# Patient Record
Sex: FEMALE | Race: BLACK OR AFRICAN AMERICAN | NOT HISPANIC OR LATINO | Employment: OTHER | URBAN - METROPOLITAN AREA
[De-identification: names, ages, dates, MRNs, and addresses within clinical notes are randomized per-mention and may not be internally consistent; named-entity substitution may affect disease eponyms.]

---

## 2017-01-19 ENCOUNTER — TRANSCRIBE ORDERS (OUTPATIENT)
Dept: ADMINISTRATIVE | Facility: HOSPITAL | Age: 47
End: 2017-01-19

## 2017-01-23 ENCOUNTER — TRANSCRIBE ORDERS (OUTPATIENT)
Dept: ADMINISTRATIVE | Facility: HOSPITAL | Age: 47
End: 2017-01-23

## 2017-01-23 ENCOUNTER — HOSPITAL ENCOUNTER (OUTPATIENT)
Dept: RADIOLOGY | Facility: HOSPITAL | Age: 47
Discharge: HOME | End: 2017-01-23
Payer: COMMERCIAL

## 2017-01-23 DIAGNOSIS — M47.816 OSTEOARTHRITIS OF LUMBAR SPINE, UNSPECIFIED SPINAL OSTEOARTHRITIS COMPLICATION STATUS: ICD-10-CM

## 2017-01-23 DIAGNOSIS — M51.16 NEURITIS OR RADICULITIS DUE TO RUPTURE OF LUMBAR INTERVERTEBRAL DISC: ICD-10-CM

## 2017-01-23 DIAGNOSIS — M47.816 OSTEOARTHRITIS OF LUMBAR SPINE, UNSPECIFIED SPINAL OSTEOARTHRITIS COMPLICATION STATUS: Primary | ICD-10-CM

## 2017-01-23 PROCEDURE — 72110 X-RAY EXAM L-2 SPINE 4/>VWS: CPT

## 2017-01-23 PROCEDURE — 72200 X-RAY EXAM SI JOINTS: CPT

## 2017-02-16 ENCOUNTER — ALLSCRIPTS OFFICE VISIT (OUTPATIENT)
Dept: OTHER | Facility: OTHER | Age: 47
End: 2017-02-16

## 2017-03-29 ENCOUNTER — GENERIC CONVERSION - ENCOUNTER (OUTPATIENT)
Dept: OTHER | Facility: OTHER | Age: 47
End: 2017-03-29

## 2017-08-24 ENCOUNTER — HOSPITAL ENCOUNTER (EMERGENCY)
Facility: HOSPITAL | Age: 47
Discharge: HOME | End: 2017-08-24
Attending: EMERGENCY MEDICINE
Payer: COMMERCIAL

## 2017-08-24 VITALS
SYSTOLIC BLOOD PRESSURE: 129 MMHG | RESPIRATION RATE: 20 BRPM | BODY MASS INDEX: 38.29 KG/M2 | DIASTOLIC BLOOD PRESSURE: 63 MMHG | HEART RATE: 86 BPM | WEIGHT: 237.22 LBS | OXYGEN SATURATION: 99 % | TEMPERATURE: 98.6 F

## 2017-08-24 DIAGNOSIS — R07.89 CHEST TIGHTNESS: ICD-10-CM

## 2017-08-24 DIAGNOSIS — F41.9 ANXIETY: Primary | ICD-10-CM

## 2017-08-24 PROCEDURE — 93005 ELECTROCARDIOGRAM TRACING: CPT | Performed by: EMERGENCY MEDICINE

## 2017-08-24 PROCEDURE — 99283 EMERGENCY DEPT VISIT LOW MDM: CPT

## 2017-08-24 RX ORDER — BUPROPION HYDROCHLORIDE 450 MG/1
450 TABLET, FILM COATED, EXTENDED RELEASE ORAL DAILY
COMMUNITY
End: 2018-05-23

## 2017-08-24 RX ORDER — HYDROXYZINE HYDROCHLORIDE 25 MG/1
25 TABLET, FILM COATED ORAL 3 TIMES DAILY
COMMUNITY
End: 2018-05-23

## 2017-08-24 RX ORDER — LORAZEPAM 1 MG/1
1 TABLET ORAL ONCE
Status: COMPLETED | OUTPATIENT
Start: 2017-08-24 | End: 2017-08-24

## 2017-08-24 RX ADMIN — LORAZEPAM 1 MG: 1 TABLET ORAL at 16:40

## 2017-08-25 LAB
ATRIAL RATE: 84 BPM
P AXIS: 71 DEGREES
PR INTERVAL: 160 MS
QRS AXIS: -34 DEGREES
QRSD INTERVAL: 108 MS
QT INTERVAL: 340 MS
QTC INTERVAL: 401 MS
T WAVE AXIS: 36 DEGREES
VENTRICULAR RATE: 84 BPM

## 2017-08-30 ENCOUNTER — ALLSCRIPTS OFFICE VISIT (OUTPATIENT)
Dept: OTHER | Facility: OTHER | Age: 47
End: 2017-08-30

## 2017-08-30 DIAGNOSIS — R79.89 OTHER SPECIFIED ABNORMAL FINDINGS OF BLOOD CHEMISTRY: ICD-10-CM

## 2017-08-30 LAB
BILIRUB UR QL STRIP: NORMAL
CLARITY UR: NORMAL
COLOR UR: NORMAL
GLUCOSE (HISTORICAL): NORMAL
HGB UR QL STRIP.AUTO: NORMAL
KETONES UR STRIP-MCNC: NORMAL MG/DL
LEUKOCYTE ESTERASE UR QL STRIP: NORMAL
NITRITE UR QL STRIP: NORMAL
PH UR STRIP.AUTO: 5 [PH]
PROT UR STRIP-MCNC: NORMAL MG/DL
SP GR UR STRIP.AUTO: 1.02
UROBILINOGEN UR QL STRIP.AUTO: 0.2

## 2017-08-31 ENCOUNTER — APPOINTMENT (EMERGENCY)
Dept: CT IMAGING | Facility: HOSPITAL | Age: 47
End: 2017-08-31
Payer: COMMERCIAL

## 2017-08-31 ENCOUNTER — APPOINTMENT (EMERGENCY)
Dept: RADIOLOGY | Facility: HOSPITAL | Age: 47
End: 2017-08-31
Payer: COMMERCIAL

## 2017-08-31 ENCOUNTER — HOSPITAL ENCOUNTER (EMERGENCY)
Facility: HOSPITAL | Age: 47
Discharge: HOME | End: 2017-08-31
Admitting: EMERGENCY MEDICINE
Payer: COMMERCIAL

## 2017-08-31 VITALS
DIASTOLIC BLOOD PRESSURE: 65 MMHG | HEART RATE: 88 BPM | TEMPERATURE: 97.7 F | OXYGEN SATURATION: 100 % | RESPIRATION RATE: 18 BRPM | WEIGHT: 231 LBS | SYSTOLIC BLOOD PRESSURE: 145 MMHG

## 2017-08-31 DIAGNOSIS — M25.512 LEFT SHOULDER PAIN: ICD-10-CM

## 2017-08-31 DIAGNOSIS — R51.9 FACIAL PAIN, ACUTE: ICD-10-CM
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
## 2017-08-31 DIAGNOSIS — M54.2 NECK PAIN, ACUTE: ICD-10-CM

## 2017-08-31 PROCEDURE — 99284 EMERGENCY DEPT VISIT MOD MDM: CPT

## 2017-08-31 PROCEDURE — 70450 CT HEAD/BRAIN W/O DYE: CPT

## 2017-08-31 PROCEDURE — 73030 X-RAY EXAM OF SHOULDER: CPT

## 2017-08-31 PROCEDURE — 72125 CT NECK SPINE W/O DYE: CPT

## 2017-08-31 RX ORDER — BUPROPION HYDROCHLORIDE 150 MG/1
150 TABLET ORAL 3 TIMES DAILY
COMMUNITY

## 2017-08-31 RX ORDER — METAXALONE 800 MG/1
400 TABLET ORAL EVERY OTHER DAY
COMMUNITY
End: 2018-06-26

## 2017-08-31 RX ORDER — DIPHENOXYLATE HYDROCHLORIDE AND ATROPINE SULFATE 2.5; .025 MG/1; MG/1
1 TABLET ORAL DAILY
COMMUNITY

## 2017-08-31 RX ORDER — MELATONIN
5000 DAILY
COMMUNITY

## 2017-08-31 RX ORDER — HYDROXYZINE HYDROCHLORIDE 25 MG/1
25 TABLET, FILM COATED ORAL 2 TIMES DAILY
COMMUNITY

## 2017-08-31 RX ORDER — HYDROXYZINE HYDROCHLORIDE 25 MG/1
25 TABLET, FILM COATED ORAL ONCE
Status: COMPLETED | OUTPATIENT
Start: 2017-08-31 | End: 2017-08-31

## 2017-08-31 RX ADMIN — HYDROXYZINE HYDROCHLORIDE 25 MG: 25 TABLET, FILM COATED ORAL at 13:02

## 2017-09-08 ENCOUNTER — GENERIC CONVERSION - ENCOUNTER (OUTPATIENT)
Dept: OTHER | Facility: OTHER | Age: 47
End: 2017-09-08

## 2017-09-25 ENCOUNTER — HOSPITAL ENCOUNTER (OUTPATIENT)
Dept: ULTRASOUND IMAGING | Facility: HOSPITAL | Age: 47
Discharge: HOME | End: 2017-09-25
Attending: INTERNAL MEDICINE
Payer: COMMERCIAL

## 2017-09-25 DIAGNOSIS — R79.89 OTHER SPECIFIED ABNORMAL FINDINGS OF BLOOD CHEMISTRY: ICD-10-CM

## 2017-09-25 PROCEDURE — 76770 US EXAM ABDO BACK WALL COMP: CPT

## 2017-09-27 ENCOUNTER — GENERIC CONVERSION - ENCOUNTER (OUTPATIENT)
Dept: OTHER | Facility: OTHER | Age: 47
End: 2017-09-27

## 2017-09-27 ENCOUNTER — TRANSCRIBE ORDERS (OUTPATIENT)
Dept: ADMINISTRATIVE | Facility: HOSPITAL | Age: 47
End: 2017-09-27

## 2017-09-27 DIAGNOSIS — R68.89 OTHER ABNORMAL CLINICAL FINDING: Primary | ICD-10-CM

## 2017-09-29 ENCOUNTER — GENERIC CONVERSION - ENCOUNTER (OUTPATIENT)
Dept: OTHER | Facility: OTHER | Age: 47
End: 2017-09-29

## 2017-10-03 ENCOUNTER — GENERIC CONVERSION - ENCOUNTER (OUTPATIENT)
Dept: OTHER | Facility: OTHER | Age: 47
End: 2017-10-03

## 2017-10-17 ENCOUNTER — GENERIC CONVERSION - ENCOUNTER (OUTPATIENT)
Dept: OTHER | Facility: OTHER | Age: 47
End: 2017-10-17

## 2017-10-23 ENCOUNTER — ALLSCRIPTS OFFICE VISIT (OUTPATIENT)
Dept: OTHER | Facility: OTHER | Age: 47
End: 2017-10-23
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
## 2017-11-20 ENCOUNTER — GENERIC CONVERSION - ENCOUNTER (OUTPATIENT)
Dept: OTHER | Facility: OTHER | Age: 47
End: 2017-11-20

## 2017-11-20 ENCOUNTER — TRANSCRIBE ORDERS (OUTPATIENT)
Dept: ADMINISTRATIVE | Facility: HOSPITAL | Age: 47
End: 2017-11-20

## 2017-11-20 DIAGNOSIS — N63.10 MASS OF BREAST, RIGHT: Primary | ICD-10-CM

## 2017-11-24 ENCOUNTER — HOSPITAL ENCOUNTER (OUTPATIENT)
Dept: ULTRASOUND IMAGING | Facility: CLINIC | Age: 47
Discharge: HOME | End: 2017-11-24
Payer: COMMERCIAL

## 2017-11-24 ENCOUNTER — HOSPITAL ENCOUNTER (OUTPATIENT)
Dept: MAMMOGRAPHY | Facility: CLINIC | Age: 47
Discharge: HOME | End: 2017-11-24
Payer: COMMERCIAL

## 2017-11-24 DIAGNOSIS — N63.10 MASS OF BREAST, RIGHT: ICD-10-CM

## 2017-11-24 PROCEDURE — 76642 ULTRASOUND BREAST LIMITED: CPT

## 2017-11-24 PROCEDURE — G0279 TOMOSYNTHESIS, MAMMO: HCPCS

## 2017-11-24 PROCEDURE — G0204 DX MAMMO INCL CAD BI: HCPCS

## 2018-01-11 NOTE — RESULT NOTES
Message   renal u/s normal  have sent task to inform patient of such  Verified Results  US KIDNEY AND BLADDER [id number] 01:03PM Oscar Mi    Order Number: [id number]   Performing Comments: decreased GFR/elevated Cr from baseline - please evaluate kidneys; and please check liver; history of PBC   - Patient Instructions: To schedule this appointment, please contact Central Scheduling at [phone]  Test Name Result Flag Reference   US KIDNEY AND BLADDER (Report)     RENAL ULTRASOUND     INDICATION: Abnormal laboratory findings  COMPARISON: None  TECHNIQUE:  Ultrasound of the retroperitoneum was performed with a curvilinear transducer utilizing volumetric sweeps and still imaging techniques  FINDINGS:     KIDNEYS:     Right kidney: 10 1 x 4 0 cm  Normal echogenicity and contour  Cortical thickness: Normal    No suspicious masses detected  No hydronephrosis  No shadowing calculi  No perinephric fluid collections  Left kidney: 9 8 x 4 8 cm  Normal echogenicity and contour  Cortical thickness: Normal    No suspicious masses detected  No hydronephrosis  No shadowing calculi  No perinephric fluid collections  URETERS:   Nonvisualized  BLADDER:    Normally distended  No focal thickening or mass lesions  Bilateral ureteral jets detected               IMPRESSION:     Normal renal sonogram         Workstation performed: [id number]     Signed by:   Pili Burton MD   9/29/17

## 2018-01-14 VITALS
BODY MASS INDEX: 37.45 KG/M2 | SYSTOLIC BLOOD PRESSURE: 126 MMHG | HEIGHT: 66 IN | WEIGHT: 233 LBS | DIASTOLIC BLOOD PRESSURE: 74 MMHG

## 2018-01-15 VITALS
WEIGHT: 238 LBS | HEART RATE: 69 BPM | SYSTOLIC BLOOD PRESSURE: 125 MMHG | BODY MASS INDEX: 38.25 KG/M2 | HEIGHT: 66 IN | DIASTOLIC BLOOD PRESSURE: 70 MMHG

## 2018-01-16 NOTE — RESULT NOTES
Verified Results  * XR CHEST PA & LATERAL [id number] 10:40AM Anthony Barajas Order Number: [id number]     Test Name Result Flag Reference   XR CHEST PA & LATERAL (Report)     CHEST      INDICATION: Solitary pulmonary nodule  COMPARISON: Chest x-ray dated July 1, 2016  CTA chest dated July 1, 2016  VIEWS: Frontal and lateral projections; 2 images     FINDINGS:        Cardiomediastinal silhouette appears unremarkable  The lungs are clear  No pneumothorax or pleural effusion  No lung nodules are identified  Visualized osseous structures appear within normal limits for the patient's age  IMPRESSION:     No active pulmonary disease         Workstation performed: [id number]     Signed by:   Sugar Wallace MD   10/12/16

## 2018-01-16 NOTE — RESULT NOTES
Message   Cr stable  minimal proteinuria  await 24 hour urine   needs improved DM control        Verified Results  Nephrology Flowsheet [id number] 02:03PM [facility]     Test Name Result Flag Reference   WBC 4 7     Hemoglobin 12 7     Hematocrit 38 1     Platelets 157     Sodium 135     Potassium 4 6     Chloride 105     Carbon Dioxide 26     Calcium 9 2     GLUCOSE 112     BUN 17     Serum Creatinine 1 20     GFR, NON-AFRICAN AMERICAN 55     Magnesium 2 3     Urine Protein Creatinine Ratio 0 09     Urinalysis Date 8/31/17     C3 158     C4 41 5     SPECIFIC GRAVITY UA 1 021     BLOOD UA NEG     PH UA 6     PROTEIN UA NEG     NITRITE UA NEG     LEUKOCYTE ESTERASE UA NEG     Glucose, Urine >JP=665

## 2018-01-17 NOTE — MISCELLANEOUS
Message   Recorded as Task   Date: 09/25/2017 04:54 PM, Created By: Neo Doss   Task Name: Follow Up   Assigned To: Neo Doss   Regarding Patient: Cristino Clay, Status: Complete   Comment:    Neo Doss - 25 Sep 2017 4:54 PM     TASK CREATED  pt had kidney US done today  your comments were to also check liver,  h/o PBC  Per the US tech, that needs to be a separate US of the right upper quadrant and will have to be ordered separately  Do you want the pt to have that done also? Paulino Oretga - 26 Sep 2017 9:11 PM     TASK REPLIED TO: Previously Assigned To Aishwarya Valle    no, should be ok for now  will await these results, then determine    thank you                        Jaron Lakhani - 27 Sep 2017 1:07 PM     TASK COMPLETED        Active Problems    1  Chest pain, unspecified type (786 50) (R07 9)   2  Diabetes mellitus (250 00) (E11 9)   3  Diffuse pain (780 96) (R52)   4  Elevated CK (790 5) (R74 8)   5  Elevated serum creatinine (790 99) (R79 89)   6  Environmental allergies (V15 09) (Z91 09)   7  Fatigue, unspecified type (780 79) (R53 83)   8  Fibromyalgia (729 1) (M79 7)   9  Lung nodule, solitary (793 11) (R91 1)   10  Muscle ache (729 1) (M79 1)   11  Neck pain (723 1) (M54 2)   12  Numbness and tingling of left arm and leg (782 0) (R20 0,R20 2)   13  Other chronic back pain (724 5,338 29) (M54 9,G89 29)   14  SOB (shortness of breath) on exertion (786 05) (R06 02)    Current Meds   1  BusPIRone HCl - 10 MG Oral Tablet; Take 1 tablet daily; Therapy: 93Fas0777 to Recorded   2  Glyxambi 10-5 MG Oral Tablet; Take 1 tablet daily; Therapy: 98Smn5082 to Recorded   3  Metaxalone 800 MG Oral Tablet; take 0 5 tablet daily; Therapy: 81Sey4646 to Recorded   4  Multiple Vitamins Oral Tablet; TAKE 1 TABLET DAILY; Therapy: 20Lvf5951 to Recorded   5  Silenor 6 MG Oral Tablet; Take 1 tablet daily; Therapy: 43Fpw7362 to Recorded   6   Wellbutrin  MG Oral Tablet Extended Release 12 Hour (BuPROPion HCl ER   (SR)); TAKE 1 TABLET DAILY AS DIRECTED; Therapy: 91Ghx8186 to Recorded    Allergies    1  Bactrim TABS   2  Shellfish-derived Products    3  Dust   4   Mold    Signatures   Electronically signed by : Mayur Luciano, ; Sep 27 2017  3:09PM EST                       (Author)

## 2018-01-18 NOTE — MISCELLANEOUS
Message     Recorded as Task   Date: 09/29/2017 02:24 PM, Created By: Arash Stovall   Task Name: Follow Up   Assigned To: Rakesh Remy   Regarding Patient: Davis Willis, Status: Active   Comment:    Paulino Ortega - 29 Sep 2017 2:24 PM     TASK CREATED  hello    can patient be notified that renal u/s is normal    thank you    manuela  Left message for pt regarding the above  Active Problems    1  Chest pain, unspecified type (786 50) (R07 9)   2  Diabetes mellitus (250 00) (E11 9)   3  Diffuse pain (780 96) (R52)   4  Elevated CK (790 5) (R74 8)   5  Elevated serum creatinine (790 99) (R79 89)   6  Environmental allergies (V15 09) (Z91 09)   7  Fatigue, unspecified type (780 79) (R53 83)   8  Fibromyalgia (729 1) (M79 7)   9  Lung nodule, solitary (793 11) (R91 1)   10  Muscle ache (729 1) (M79 1)   11  Neck pain (723 1) (M54 2)   12  Numbness and tingling of left arm and leg (782 0) (R20 0,R20 2)   13  Other chronic back pain (724 5,338 29) (M54 9,G89 29)   14  SOB (shortness of breath) on exertion (786 05) (R06 02)    Current Meds   1  BusPIRone HCl - 10 MG Oral Tablet; Take 1 tablet daily; Therapy: 73Esn7730 to Recorded   2  Glyxambi 10-5 MG Oral Tablet; Take 1 tablet daily; Therapy: 37Kwd3284 to Recorded   3  Metaxalone 800 MG Oral Tablet; take 0 5 tablet daily; Therapy: 35Vng0716 to Recorded   4  Multiple Vitamins Oral Tablet; TAKE 1 TABLET DAILY; Therapy: 50Odh8515 to Recorded   5  Silenor 6 MG Oral Tablet; Take 1 tablet daily; Therapy: 58Pyq1657 to Recorded   6  Wellbutrin  MG Oral Tablet Extended Release 12 Hour (BuPROPion HCl ER   (SR)); TAKE 1 TABLET DAILY AS DIRECTED; Therapy: 32Cfp9589 to Recorded    Allergies    1  Bactrim TABS   2  Shellfish-derived Products    3  Dust   4   Mold    Signatures   Electronically signed by : Can Casey, ; Oct  3 2017  8:32AM EST                       (Author)

## 2018-01-22 ENCOUNTER — ALLSCRIPTS OFFICE VISIT (OUTPATIENT)
Dept: OTHER | Facility: OTHER | Age: 48
End: 2018-01-22

## 2018-01-23 DIAGNOSIS — R79.89 OTHER SPECIFIED ABNORMAL FINDINGS OF BLOOD CHEMISTRY: ICD-10-CM

## 2018-01-23 DIAGNOSIS — N63.0 MASS OF BREAST: ICD-10-CM

## 2018-01-23 NOTE — CONSULTS
Assessment   1  Abnormal finding on breast imaging (793 89) (R92 8)   2  Breast lump (611 72) (N63 0)    Plan   Abnormal finding on breast imaging, Breast lump    · MAMMO DIAGNOSTIC RIGHT W CAD; Status:Hold For - Scheduling; Requested    for:[id number]; Perform:[facility]; SRV:[id number];RSOVRME; For:Abnormal finding on breast imaging, Breast lump; Ordered By:Debora Stallings;  Breast lump    · *US BREAST RIGHT LIMITED (DIAGNOSTIC); Status:Hold For - Scheduling; Requested    Win Oliver; Perform:[facility]; TON:[id number];DZLOQYS; For:Breast lump; Ordered By:Debora Stallings;   · Follow-up visit in 6 months Evaluation and Treatment  Follow-up  Status: Hold For -    Scheduling  Requested for: [id number]   Ordered; For: Breast lump; Ordered By: Christal Ritter Performed:  Due: [id number]; Last Updated By: Regan Cabello; 2018 3:24:38 PM    Discussion/Summary    19-year-old female with a right breast lump that is a benign in appearance and also on examination today has benign characteristics  She recently noted this; however, this does appear to be present on her imaging back to 2016  However additional diagnostic imaging was recommended in six months, which will be the end of May  I will make these arrangements for her  I will see her again in six months for short-term follow-up her sooner should need arise  Chief Complaint   Chief Complaint Free Text Note Form: Pt is here for a consultation regarding a right breast palpable lump  Pt shares she has felt this lump for a couple months and it is located at the 1-2 0'clock at the areola  Pt denies any nipple drainage  Breast imagin17 Ruslan 3D dx mammogram/right US, B3 (2)  Providers: Danica Isabel  History of Present Illness   HPI: 19-year-old female here today secondary to a lump in the inner right breast  She recently noted this  She did have diagnostic imaging  The area was seen and benign and likely present on her 2016 imaging  She denies any family history of breast cancer  Review of Systems   Complete Female ROS SurgOnc:      Constitutional: recent weight gain-- and-- recent weight loss  Eyes: No complaints of visual problems, no scleral icterus  ENT: no complaints of ear pain, no hoarseness, no difficulty swallowing,-- no tinnitus-- and-- no new masses in head, oral cavity, or neck  Cardiovascular: palpitations  Respiratory: No complaints of shortness of breath, no cough  Gastrointestinal: No complaints of jaundice, no bloody stools, no pale stools  Genitourinary: No complaints of dysuria, no hematuria, no nocturia, no frequent urination, no urethral discharge  Musculoskeletal: myalgias  Integumentary: No complaints of rash, no new lesions  Neurological: headache  Hematologic/Lymphatic: No complaints of easy bruising  Active Problems   1  Abnormal finding on breast imaging (793 89) (R92 8)   2  Breast lump (611 72) (N63 0)   3  Chest pain, unspecified type (786 50) (R07 9)   4  Diabetes mellitus (250 00) (E11 9)   5  Diffuse pain (780 96) (R52)   6  Elevated CK (790 5) (R74 8)   7  Elevated serum creatinine (790 99) (R79 89)   8  Environmental allergies (V15 09) (Z91 09)   9  Fatigue, unspecified type (780 79) (R53 83)   10  Fibromyalgia (729 1) (M79 7)   11  Lung nodule, solitary (793 11) (R91 1)   12  Muscle ache (729 1) (M79 1)   13  Neck pain (723 1) (M54 2)   14  Numbness and tingling of left arm and leg (782 0) (R20 0,R20 2)   15  Other chronic back pain (724 5,338 29) (M54 9,G89 29)   16  SOB (shortness of breath) on exertion (786 05) (R06 02)    Past Medical History   1  History of Anxiety (300 00) (F41 9)   2  History of Difficulty walking (719 7) (R26 2)   3  History of Difficulty walking up stairs (719 7) (R26 2)   4  History of arthritis (V13 4) (Z87 39)   5  History of cardiac murmur (V12 59) (Z86 79)   6  History of depression (V11 8) (Z86 59)   7   History of headache (V13 89) (Z87 898)   8  History of insomnia (V13 89) (Z87 898)   9  History of palpitations (V12 59) (Z87 898)   10  History of pregnancy (V13 29)   11  History of sore throat (V12 60) (Z87 09)   12  History of Menarche (V21 8)   13  History of Primary biliary cirrhosis (571 6) (K74 3)   14  History of Uses birth control (V25 9) (Z30 9)  Active Problems And Past Medical History Reviewed: The active problems and past medical history were reviewed and updated today  Surgical History   1  History of Hysterectomy  Surgical History Reviewed: The surgical history was reviewed and updated today  Family History   Mother    1  Family history of arthritis (V17 7) (Z82 61)   2  Family history of cardiac disorder (V17 49) (Z82 49)   3  Family history of diabetes mellitus (V18 0) (Z83 3)   4  Family history of malignant neoplasm of cervix (V16 49) (Z80 49)  Father    5  Family history of Malignant neoplasm of overlapping sites of left lung  Maternal Grandmother    6  Family history of multiple myeloma (V16 7) (Z80 7)  Paternal Uncle    7  Family history of malignant neoplasm of stomach (V16 0) (Z80 0)  Family History Reviewed: The family history was reviewed and updated today  Social History    · Current smoker (305 1) (F17 200)   ·    · Never a smoker   · Occasional alcohol use  Social History Reviewed: The social history was reviewed and updated today  The social history was reviewed and is unchanged  Current Meds    1  Atarax TABS; pt takes 75mg daily for anxiety; Therapy: [phone]) to Recorded   2  Ativan 0 5 MG Oral Tablet; TAKE 1 TABLET DAILY; Therapy: [phone]) to Recorded   3  Flax Seed Oil 1000 MG Oral Capsule; pt takes 1000mg daily; Therapy: [phone]) to Recorded   4  Glyxambi 10-5 MG Oral Tablet; Take 1 tablet daily; Therapy: 19Pez1667 to Recorded   5  Metaxalone 800 MG Oral Tablet; take 0 5 tablet daily;      Therapy: [id number] to Recorded   6  Milk Thistle CAPS; 100mg daily; Therapy: (Emerson June) to Recorded   7  Multiple Vitamins Oral Tablet; TAKE 1 TABLET DAILY; Therapy: 30Aug2017 to Recorded   8  Silenor 6 MG Oral Tablet; Take 1 tablet daily; Therapy: 30Aug2017 to Recorded   9  Vitamin D3 TABS; pt takes 5,000 iu every other day; Therapy: (Emerson June) to Recorded   10  Wellbutrin  MG Oral Tablet Extended Release 12 Hour; TAKE 1 TABLET DAILY AS      DIRECTED; Therapy: 30Aug2017 to Recorded  Medication List Reviewed: The medication list was reviewed and updated today  Allergies   1  Bactrim TABS   2  Shellfish-derived Products  3  Dust   4  Mold    Vitals   Vital Signs    Recorded: 38FPU4657 02:43PM   Temperature 100 2 F   Heart Rate 98   Respiration 12   Systolic 256   Diastolic 70   Height 5 ft 6 in   Weight 238 lb    BMI Calculated 38 41   BSA Calculated 2 15   O2 Saturation 99     Physical Exam        Constitutional: General appearance: The Patient is well-developed, well-nourished female who appears her stated age in no acute distress  She is pleasant and talkative  Neuro: Orientation to person, place and time: Normal  -- Mood and affect: Normal        Lymphatic: no evidence of cervical adenopathy bilaterally  -- no evidence of axillary adenopathy bilaterally  Skin: Examination of Breast: Abnormal   Breast: Examination of both breasts revealed fibrocystic changes  Examination of the right breast revealed no erythema,-- no swelling-- and-- no tenderness  Examination of the left breast revealed no erythema,-- no swelling-- and-- no tenderness  Nipples appeared normal No discharge was noted from the nipples  A superficial, mobile, nontender subareolar mass was palpated in the right breast  No mass was palpable in the left breast      Axillary nodes: no enlarged nodes        Results/Data   Diagnostic Studies Reviewed Surg Onc:      X-ray Review 11/24/17 Bilateral 3D diagnostic mammogram and right breast ultrasound is likely benign: there is a small hypoechoic lesion at two o'clock in the area of palpable finding subcentimeter in size; this appears to be present on her imaging from 2016  There are no other concerns  She is a density category two        Signatures    Electronically signed by : COLEMAN Luke ; Jan 22 2018  4:20PM EST                       (Author)

## 2018-02-01 ENCOUNTER — TRANSCRIBE ORDERS (OUTPATIENT)
Dept: ADMINISTRATIVE | Facility: HOSPITAL | Age: 48
End: 2018-02-01

## 2018-02-01 DIAGNOSIS — Z12.31 VISIT FOR SCREENING MAMMOGRAM: Primary | ICD-10-CM

## 2018-02-07 ENCOUNTER — DOCUMENTATION (OUTPATIENT)
Dept: NEPHROLOGY | Facility: CLINIC | Age: 48
End: 2018-02-07

## 2018-02-22 DIAGNOSIS — E83.41 HYPERMAGNESEMIA: Primary | ICD-10-CM

## 2018-03-22 ENCOUNTER — DOCUMENTATION (OUTPATIENT)
Dept: NEUROLOGY | Facility: CLINIC | Age: 48
End: 2018-03-22

## 2018-05-08 ENCOUNTER — TELEPHONE (OUTPATIENT)
Dept: NEUROLOGY | Facility: CLINIC | Age: 48
End: 2018-05-08

## 2018-05-08 DIAGNOSIS — R51.9 FREQUENT HEADACHES: Primary | ICD-10-CM

## 2018-05-08 RX ORDER — BUTALBITAL, ACETAMINOPHEN AND CAFFEINE 50; 325; 40 MG/1; MG/1; MG/1
1 TABLET ORAL DAILY PRN
Qty: 30 TABLET | Refills: 0 | Status: SHIPPED | OUTPATIENT
Start: 2018-05-08 | End: 2018-09-25 | Stop reason: SDUPTHER

## 2018-05-08 RX ORDER — VERAPAMIL HYDROCHLORIDE 120 MG/1
120 CAPSULE, EXTENDED RELEASE ORAL
Qty: 30 CAPSULE | Refills: 2 | Status: SHIPPED | OUTPATIENT
Start: 2018-05-08 | End: 2018-05-23

## 2018-05-08 NOTE — TELEPHONE ENCOUNTER
I called the patient and explained that two RXs were sent to her pharmacy which should not affect her kidney function

## 2018-05-08 NOTE — TELEPHONE ENCOUNTER
The patient called and was recently diagnosed with fibromyalgia  However, for the past 2 months she has been having bad headaches  She is seeing you on 6/26 for a fu, however, she was wandering in the meantime if you could recommend what she could do  She did have MRI of the brain in 12/2016 which was normal   She is not on any medications right now for her headaches as her kidney function is decreasing

## 2018-05-23 ENCOUNTER — TELEPHONE (OUTPATIENT)
Dept: NEPHROLOGY | Facility: CLINIC | Age: 48
End: 2018-05-23

## 2018-05-23 ENCOUNTER — OFFICE VISIT (OUTPATIENT)
Dept: NEPHROLOGY | Facility: CLINIC | Age: 48
End: 2018-05-23
Payer: COMMERCIAL

## 2018-05-23 VITALS
SYSTOLIC BLOOD PRESSURE: 110 MMHG | DIASTOLIC BLOOD PRESSURE: 62 MMHG | HEIGHT: 66 IN | WEIGHT: 229 LBS | BODY MASS INDEX: 36.8 KG/M2

## 2018-05-23 DIAGNOSIS — R79.89 ELEVATED SERUM CREATININE: Primary | ICD-10-CM

## 2018-05-23 PROCEDURE — 99213 OFFICE O/P EST LOW 20 MIN: CPT | Performed by: INTERNAL MEDICINE

## 2018-05-23 NOTE — TELEPHONE ENCOUNTER
----- Message from Chelsea Cooper sent at 5/23/2018  1:22 PM EDT -----  Left message with office regarding the above   ----- Message -----  From: Jordyn Thompson MD  Sent: 5/23/2018  12:15 PM  To: Nephrology [address] [facility]    Can we inform Dr Yunior Sinclair office to inform them that patient had positive ASO titer and ZENA titer on repeat tests  These are at Dr Carl Soliman office and they will be sending over  Pt wanted us to make sure Dr Jose Alfredo Lopez office knew about these  From renal end - if we can let Dr Jose Alfredo Lopez office know that renal function is stable       Thank you    manuela

## 2018-05-23 NOTE — LETTER
May 23, 2018     Delia Carter DO  [address] [facility]  [address]  [address] [place] [address]    Patient: Gloria Mac   YOB: 1970   Date of Visit: 5/23/2018       Dear Dr Donald Lambert Recipients: Thank you for referring Gloria Mac to me for evaluation  Below are my notes for this consultation  If you have questions, please do not hesitate to call me  I look forward to following your patient along with you  Sincerely,        Adan Newberry MD        CC: No Recipients  Adan Newberry MD  5/23/2018 12:39 PM  Sign at close encounter  [id number] 41 y [address]  female MRN: [id number]  5/23/2018    Reason for Visit: decreased GFR    ASSESSMENT and PLAN:    I had the pleasure of seeing Ms Jhony Pedersen today in the renal clinic for the continued management of decreased GFR  Since our last visit, there has been no ER visits or hospitalizations  He currently has no complaints at this time and is feeling well  Patient denies any chest pain, shortness of breath and swelling  Continues to have fibromyalgia pain  40 yo female with PMHx of fibromyalgia, primary biliary cholangitis, DM II since 2011, depression, anxiety who presents for f/u visit for elevated Cr  Patient had initially presented to ER at [facility] in Jul of 2016 With chest pain  Patient had a negative nuclear stress test at that time  Patient had an extensive serological workup of which in ZENA was positive, CRP was elevated 8 7  Patient had a negative hepatitis B surface antigen, Lyme, TPO, G6PD, ANCA, Sjogren, HLA B27, anti ds DNA, Hep C  Patient had elevated mitochondrial Ab titer; SPEP with elevated total globulin and beta globulin  ZENA was positive 1:160 with speckled pattern and ASO titer high at 318 and treat with antibiotic  Patient was also noted to have elevated CPK and was being seen by Neurology with negative EMG   Admitted to the hospital on July 1 2017 for chest pain with a normal nuclear stress test  Was also treated for urinary tract infection  Mother had renal transplant; father had HTN and had CKD; mother also had heart disease  Youngest brother with solitary kidney  Patient has strong family history of rheumatoid arthritis, sarcoidosis in cousin, and lupus  1) elevated Cr - CR 0 98 in 2016  KURT normal KURT  ANCA neg; Anti DS DNA Ab neg, ASO elevated 318, ZENA present speckled pattern, but positive anti mitochondrial Ab  repeat labs in January 2017 with negative cryo, negative DNAse Ab, negative anti sm mm Ab, negative ZENA screen  C3, C4 normal  Labs from 4/2017 with Creatinine 1 12 mg/dL  labs from 8/28 - UA with + glucose, negative protein, negative blood; Cr was 1 25 mg/dL with normal electrolytes  Overall, Cr is slowly rising since 2016 but is now stable, but i am not sure of prior values prior to last year  Patient has history of DM since 2011, but A1c appears relatively well controlled  Glyxambi can cause nephrotoxicity when used with ACEi or ARB or other nephrotoxic agents but patient is not taking nephrotoxic agents  Though, patient did take NSAIDs 1-2 times per week due to headaches  Patient is no longer taking NSAIDs  During last visit, repeat labwork including BMP, 24 hour urine collection, C3, C4  Patient has an extensive serological work up completed in the past that does not suggest an active autoimmune disorder that could be affecting the kidney or secondary causes such as hepatitis       - Cr in august is 1 2 mg/dL, electrolytes stable, GFR 55  - C3, 158, C4 41 5  - UPCR 0 09 mg/dL  - UA - neg blood, neg protein; + glucose  - 24 hour CrCl in Sept 2017 is 108 ml/min - appropriate for weight; adequate collection  - repeat 24 hour CrCl is 109 in 2018  - renal u/s - sept 2017 - with R 10 1 cm, L 9 8 cm, nl echogenicity, nl renal u/s    - ok to continue glyxambi for now, but may need to change if GFR < 45  - avoid NSAIDs if possible  - for now, I reviewed with the patient that I am not seeing sign of autoimmune issue with kidney, NAE, or progression of renal dysfunction  CrCl are appropriate  - I offered second opinion if patient would want - pt declines for now  - we will message Dr Frank Tyler office regarding positive ASO per PCP office (we do not have these labs but pt states she already spoke with PCP to send records to Rheumatology office)  - labwork in 3 months, appt thereafter    2) Primary biliary cirrhosis - + anti mitochondiral Ab; positive ZENA    3) lung nodule - followed with pulm  3 mm nodule    4) pre DM - Hb1c 6  3  and on 8/28 is 6 5%    - avoid glyxambia if CrCl < 45    5) elevated CPK prior- aldolase 2    as per Primary Team    6) fibromyalgia    7) mag 2 3 - slightly higher than nl    - repeat is stable    It was a pleasure evaluating Ms Ashley Jha in the renal office and thank you for allowing our team to participate in the care of your patient  Please do not hesitate to contact our team in the interim if further questions/issues arise  No problem-specific Assessment & Plan notes found for this encounter  HPI:    Patient presents for follow-up visit  Denies complaints with the exception of chronic pain  PATIENT INSTRUCTIONS:    Patient Instructions   1) please do your labwork in 3 months        OBJECTIVE:  Current Weight: Weight - Scale: 104 kg (229 lb)  Vitals:    05/23/18 1144   BP: 110/62   BP Location: Right arm   Patient Position: Sitting   Cuff Size: Large   Weight: 104 kg (229 lb)   Height: 5' 6" (1 676 m)    Body mass index is 36 96 kg/m²  REVIEW OF SYSTEMS:    Review of Systems   Constitutional: Negative  Negative for fatigue  HENT: Negative  Eyes: Negative  Respiratory: Negative  Negative for shortness of breath  Cardiovascular: Negative  Negative for leg swelling  Gastrointestinal: Negative  Endocrine: Negative  Genitourinary: Negative  Negative for difficulty urinating     Musculoskeletal: Positive for arthralgias and back pain  Skin: Negative  Allergic/Immunologic: Negative  Neurological: Negative  Hematological: Negative  Psychiatric/Behavioral: Negative  All other systems reviewed and are negative  PHYSICAL EXAM:      Physical Exam   Constitutional: She is oriented to person, place, and time  She appears well-developed and well-nourished  No distress  HENT:   Head: Normocephalic and atraumatic  Mouth/Throat: No oropharyngeal exudate  Eyes: Conjunctivae are normal  Right eye exhibits no discharge  Left eye exhibits no discharge  No scleral icterus  Neck: Normal range of motion  Neck supple  No JVD present  Cardiovascular: Normal rate and regular rhythm  Exam reveals no gallop and no friction rub  No murmur heard  Pulmonary/Chest: Effort normal and breath sounds normal  No respiratory distress  She has no wheezes  She has no rales  Abdominal: Soft  Bowel sounds are normal  She exhibits no distension  There is no tenderness  There is no rebound  Musculoskeletal: Normal range of motion  She exhibits no edema, tenderness or deformity  Neurological: She is alert and oriented to person, place, and time  Coordination normal    Skin: Skin is warm and dry  No rash noted  She is not diaphoretic  No erythema  No pallor  Psychiatric: She has a normal mood and affect  Her behavior is normal  Judgment and thought content normal    Nursing note and vitals reviewed        Medications:    Current Outpatient Prescriptions:     buPROPion (WELLBUTRIN XL) 150 mg 24 hr tablet, Take 150 mg by mouth daily, Disp: , Rfl:     butalbital-acetaminophen-caffeine (FIORICET,ESGIC) -40 mg per tablet, Take 1 tablet by mouth daily as needed for headaches or migraine, Disp: 30 tablet, Rfl: 0    cholecalciferol (VITAMIN D3) 1,000 units tablet, Take 1,000 Units by mouth daily, Disp: , Rfl:     hydrOXYzine HCL (ATARAX) 25 mg tablet, Take 25 mg by mouth 2 (two) times a day  , Disp: , Rfl:     multivitamin (THERAGRAN) TABS, Take 1 tablet by mouth daily, Disp: , Rfl:     metaxalone (SKELAXIN) 800 mg tablet, Take 400 mg by mouth every other day, Disp: , Rfl:     NON FORMULARY, Glyambi  10/ 5 for diabetes   Take 1 tablet in the morning , Disp: , Rfl:     Laboratory Results:        Results for orders placed or performed in visit on 08/30/17   POCT urinalysis dipstick (Historical)   Result Value Ref Range    Color, UA Maria Isabel     Clarity, UA Cloudy     Leukocytes, UA neg     Nitrite, UA neg     Blood, UA neg     Bilirubin, UA neg     Urobilinogen, UA 0 2     Protein, UA trace     pH, UA 5 0     Specific Miracle, UA 1 025     Ketones, UA neg     Glucose neg

## 2018-05-23 NOTE — PROGRESS NOTES
NEPHROLOGY OFFICE VISIT   René Ramirez 41 y o  female MRN: [id number]  5/23/2018    Reason for Visit: decreased GFR    ASSESSMENT and PLAN:    I had the pleasure of seeing Ms Paige Yates today in the renal clinic for the continued management of decreased GFR  Since our last visit, there has been no ER visits or hospitalizations  He currently has no complaints at this time and is feeling well  Patient denies any chest pain, shortness of breath and swelling  Continues to have fibromyalgia pain  42 yo female with PMHx of fibromyalgia, primary biliary cholangitis, DM II since 2011, depression, anxiety who presents for f/u visit for elevated Cr  Patient had initially presented to ER at [facility] in Jul of 2016 With chest pain  Patient had a negative nuclear stress test at that time  Patient had an extensive serological workup of which in ZENA was positive, CRP was elevated 8 7  Patient had a negative hepatitis B surface antigen, Lyme, TPO, G6PD, ANCA, Sjogren, HLA B27, anti ds DNA, Hep C  Patient had elevated mitochondrial Ab titer; SPEP with elevated total globulin and beta globulin  ZENA was positive 1:160 with speckled pattern and ASO titer high at 318 and treat with antibiotic  Patient was also noted to have elevated CPK and was being seen by Neurology with negative EMG  Admitted to the hospital on July 1 2017 for chest pain with a normal nuclear stress test  Was also treated for urinary tract infection  Mother had renal transplant; father had HTN and had CKD; mother also had heart disease  Youngest brother with solitary kidney  Patient has strong family history of rheumatoid arthritis, sarcoidosis in cousin, and lupus  1) elevated Cr - CR 0 98 in 2016  KURT normal KURT  ANCA neg; Anti DS DNA Ab neg, ASO elevated 318, ZENA present speckled pattern, but positive anti mitochondrial Ab  repeat labs in January 2017 with negative cryo, negative DNAse Ab, negative anti sm mm Ab, negative ZENA screen   C3, C4 normal  Labs from 4/2017 with Creatinine 1 12 mg/dL  labs from 8/28 - UA with + glucose, negative protein, negative blood; Cr was 1 25 mg/dL with normal electrolytes  Overall, Cr is slowly rising since 2016 but is now stable, but i am not sure of prior values prior to last year  Patient has history of DM since 2011, but A1c appears relatively well controlled  Glyxambi can cause nephrotoxicity when used with ACEi or ARB or other nephrotoxic agents but patient is not taking nephrotoxic agents  Though, patient did take NSAIDs 1-2 times per week due to headaches  Patient is no longer taking NSAIDs  During last visit, repeat labwork including BMP, 24 hour urine collection, C3, C4  Patient has an extensive serological work up completed in the past that does not suggest an active autoimmune disorder that could be affecting the kidney or secondary causes such as hepatitis  - Cr in august is 1 2 mg/dL, electrolytes stable, GFR 55  - C3, 158, C4 41 5  - UPCR 0 09 mg/dL  - UA - neg blood, neg protein; + glucose  - 24 hour CrCl in Sept 2017 is 108 ml/min - appropriate for weight; adequate collection  - repeat 24 hour CrCl is 109 in 2018  - renal u/s - sept 2017 - with R 10 1 cm, L 9 8 cm, nl echogenicity, nl renal u/s    - ok to continue glyxambi for now, but may need to change if GFR < 45  - avoid NSAIDs if possible  - for now, I reviewed with the patient that I am not seeing sign of autoimmune issue with kidney, NAE, or progression of renal dysfunction  CrCl are appropriate  - I offered second opinion if patient would want - pt declines for now  - we will message Dr Khushboo Yoder office regarding positive ASO per PCP office (we do not have these labs but pt states she already spoke with PCP to send records to Rheumatology office)  - labwork in 3 months, appt thereafter    2) Primary biliary cirrhosis - + anti mitochondiral Ab; positive ZENA    3) lung nodule - followed with pulm  3 mm nodule    4) pre DM - Hb1c 6  3  and on 8/28 is 6 5%    - avoid glyxambia if CrCl < 45    5) elevated CPK prior- aldolase 2    as per Primary Team    6) fibromyalgia    7) mag 2 3 - slightly higher than nl    - repeat is stable    It was a pleasure evaluating Ms Aldo Conley in the renal office and thank you for allowing our team to participate in the care of your patient  Please do not hesitate to contact our team in the interim if further questions/issues arise  No problem-specific Assessment & Plan notes found for this encounter  HPI:    Patient presents for follow-up visit  Denies complaints with the exception of chronic pain  PATIENT INSTRUCTIONS:    Patient Instructions   1) please do your labwork in 3 months        OBJECTIVE:  Current Weight: Weight - Scale: 104 kg (229 lb)  Vitals:    05/23/18 1144   BP: 110/62   BP Location: Right arm   Patient Position: Sitting   Cuff Size: Large   Weight: 104 kg (229 lb)   Height: 5' 6" (1 676 m)    Body mass index is 36 96 kg/m²  REVIEW OF SYSTEMS:    Review of Systems   Constitutional: Negative  Negative for fatigue  HENT: Negative  Eyes: Negative  Respiratory: Negative  Negative for shortness of breath  Cardiovascular: Negative  Negative for leg swelling  Gastrointestinal: Negative  Endocrine: Negative  Genitourinary: Negative  Negative for difficulty urinating  Musculoskeletal: Positive for arthralgias and back pain  Skin: Negative  Allergic/Immunologic: Negative  Neurological: Negative  Hematological: Negative  Psychiatric/Behavioral: Negative  All other systems reviewed and are negative  PHYSICAL EXAM:      Physical Exam   Constitutional: She is oriented to person, place, and time  She appears well-developed and well-nourished  No distress  HENT:   Head: Normocephalic and atraumatic  Mouth/Throat: No oropharyngeal exudate  Eyes: Conjunctivae are normal  Right eye exhibits no discharge  Left eye exhibits no discharge   No scleral icterus  Neck: Normal range of motion  Neck supple  No JVD present  Cardiovascular: Normal rate and regular rhythm  Exam reveals no gallop and no friction rub  No murmur heard  Pulmonary/Chest: Effort normal and breath sounds normal  No respiratory distress  She has no wheezes  She has no rales  Abdominal: Soft  Bowel sounds are normal  She exhibits no distension  There is no tenderness  There is no rebound  Musculoskeletal: Normal range of motion  She exhibits no edema, tenderness or deformity  Neurological: She is alert and oriented to person, place, and time  Coordination normal    Skin: Skin is warm and dry  No rash noted  She is not diaphoretic  No erythema  No pallor  Psychiatric: She has a normal mood and affect  Her behavior is normal  Judgment and thought content normal    Nursing note and vitals reviewed  Medications:    Current Outpatient Prescriptions:     buPROPion (WELLBUTRIN XL) 150 mg 24 hr tablet, Take 150 mg by mouth daily, Disp: , Rfl:     butalbital-acetaminophen-caffeine (FIORICET,ESGIC) -40 mg per tablet, Take 1 tablet by mouth daily as needed for headaches or migraine, Disp: 30 tablet, Rfl: 0    cholecalciferol (VITAMIN D3) 1,000 units tablet, Take 1,000 Units by mouth daily, Disp: , Rfl:     hydrOXYzine HCL (ATARAX) 25 mg tablet, Take 25 mg by mouth 2 (two) times a day  , Disp: , Rfl:     multivitamin (THERAGRAN) TABS, Take 1 tablet by mouth daily, Disp: , Rfl:     metaxalone (SKELAXIN) 800 mg tablet, Take 400 mg by mouth every other day, Disp: , Rfl:     NON FORMULARY, Glyambi  10/ 5 for diabetes   Take 1 tablet in the morning , Disp: , Rfl:     Laboratory Results:        Results for orders placed or performed in visit on 08/30/17   POCT urinalysis dipstick (Historical)   Result Value Ref Range    Color, UA Maria Isabel     Clarity, UA Cloudy     Leukocytes, UA neg     Nitrite, UA neg     Blood, UA neg     Bilirubin, UA neg     Urobilinogen, UA 0 2 Protein, UA trace     pH, UA 5 0     Specific [place], UA 1 025     Ketones, UA neg     Glucose neg

## 2018-05-24 DIAGNOSIS — N63.0 MASS OF BREAST: ICD-10-CM

## 2018-05-24 DIAGNOSIS — R92.8 OTHER ABNORMAL AND INCONCLUSIVE FINDINGS ON DIAGNOSTIC IMAGING OF BREAST: ICD-10-CM

## 2018-05-30 LAB
EXT GLUCOSE BLD: 121
EXTERNAL BUN: 15
EXTERNAL CALCIUM: 8.8
EXTERNAL CHLORIDE: 107
EXTERNAL CO2: 27
EXTERNAL CREATININE: 1.17
EXTERNAL EGFR: 56
EXTERNAL POTASSIUM: 4.4
EXTERNAL SODIUM: 140

## 2018-06-26 ENCOUNTER — OFFICE VISIT (OUTPATIENT)
Dept: NEUROLOGY | Facility: CLINIC | Age: 48
End: 2018-06-26
Payer: COMMERCIAL

## 2018-06-26 VITALS
HEART RATE: 76 BPM | HEIGHT: 66 IN | BODY MASS INDEX: 36.8 KG/M2 | WEIGHT: 229 LBS | SYSTOLIC BLOOD PRESSURE: 128 MMHG | DIASTOLIC BLOOD PRESSURE: 74 MMHG

## 2018-06-26 DIAGNOSIS — R93.7 ABNORMAL MRI, CERVICAL SPINE: ICD-10-CM

## 2018-06-26 DIAGNOSIS — Z87.39 HISTORY OF FIBROMYALGIA: ICD-10-CM

## 2018-06-26 DIAGNOSIS — G43.119 INTRACTABLE MIGRAINE WITH AURA WITHOUT STATUS MIGRAINOSUS: ICD-10-CM

## 2018-06-26 DIAGNOSIS — G44.309 POST-CONCUSSION HEADACHE: ICD-10-CM

## 2018-06-26 DIAGNOSIS — R51.9 FREQUENT HEADACHES: ICD-10-CM

## 2018-06-26 DIAGNOSIS — M54.2 NECK PAIN: Primary | ICD-10-CM

## 2018-06-26 DIAGNOSIS — G44.86 CERVICOGENIC HEADACHE: ICD-10-CM

## 2018-06-26 PROCEDURE — 99215 OFFICE O/P EST HI 40 MIN: CPT | Performed by: PSYCHIATRY & NEUROLOGY

## 2018-06-26 RX ORDER — LORAZEPAM 0.5 MG/1
TABLET ORAL
COMMUNITY

## 2018-06-26 RX ORDER — AMITRIPTYLINE HYDROCHLORIDE 25 MG/1
25 TABLET, FILM COATED ORAL
Qty: 30 TABLET | Refills: 1 | Status: SHIPPED | OUTPATIENT
Start: 2018-06-26 | End: 2019-11-13

## 2018-06-26 RX ORDER — DOXEPIN HYDROCHLORIDE 10 MG/1
CAPSULE ORAL
COMMUNITY
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
## 2018-07-06 ENCOUNTER — HOSPITAL ENCOUNTER (OUTPATIENT)
Dept: ULTRASOUND IMAGING | Facility: CLINIC | Age: 48
Discharge: HOME | End: 2018-07-06
Payer: COMMERCIAL

## 2018-07-06 ENCOUNTER — HOSPITAL ENCOUNTER (OUTPATIENT)
Dept: MAMMOGRAPHY | Facility: CLINIC | Age: 48
Discharge: HOME | End: 2018-07-06
Payer: COMMERCIAL

## 2018-07-06 DIAGNOSIS — R92.8 OTHER ABNORMAL AND INCONCLUSIVE FINDINGS ON DIAGNOSTIC IMAGING OF BREAST: ICD-10-CM

## 2018-07-06 DIAGNOSIS — N63.0 MASS OF BREAST: ICD-10-CM

## 2018-07-06 PROCEDURE — G0279 TOMOSYNTHESIS, MAMMO: HCPCS

## 2018-07-06 PROCEDURE — 76642 ULTRASOUND BREAST LIMITED: CPT

## 2018-07-06 PROCEDURE — 77065 DX MAMMO INCL CAD UNI: CPT

## 2018-07-19 ENCOUNTER — TELEPHONE (OUTPATIENT)
Dept: SURGICAL ONCOLOGY | Facility: HOSPITAL | Age: 48
End: 2018-07-19

## 2018-07-19 NOTE — TELEPHONE ENCOUNTER
Pt called and questioned whether she needed to see Dr MONSIVAIS [facility] at her scheduled appt for Wednesday since her breast imaging was stable and she was concerned about her financial concerns/copay  Spoke with Dr MONSIVAIS [facility] and she recommended pt could follow up in [facility]  Pt chose to be seen in Dec 2018  Appt scheduled

## 2018-08-03 ENCOUNTER — OFFICE VISIT (OUTPATIENT)
Dept: NEUROSURGERY | Facility: CLINIC | Age: 48
End: 2018-08-03
Payer: COMMERCIAL

## 2018-08-03 VITALS
DIASTOLIC BLOOD PRESSURE: 80 MMHG | BODY MASS INDEX: 37.16 KG/M2 | WEIGHT: 231.2 LBS | TEMPERATURE: 98.5 F | HEIGHT: 66 IN | HEART RATE: 87 BPM | SYSTOLIC BLOOD PRESSURE: 120 MMHG

## 2018-08-03 DIAGNOSIS — R93.7 ABNORMAL MRI, CERVICAL SPINE: ICD-10-CM

## 2018-08-03 DIAGNOSIS — M54.2 NECK PAIN: Primary | ICD-10-CM

## 2018-08-03 DIAGNOSIS — G96.191 PERINEURAL CYSTS: ICD-10-CM

## 2018-08-03 PROCEDURE — 99203 OFFICE O/P NEW LOW 30 MIN: CPT | Performed by: PHYSICIAN ASSISTANT

## 2018-08-03 NOTE — PATIENT INSTRUCTIONS
Suggest you pursue Pain Management evaluation  Recommend you also follow up with your established Neurologist, Psychiatrist, and Primary Care Provider  Seek medical attention and present to an Emergency Room if experience worsening or new neck/arm pain, sensory or motor change, bladder or bowel dysfunction, or other neurological change
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
## 2018-08-06 ENCOUNTER — TELEPHONE (OUTPATIENT)
Dept: NEUROSURGERY | Facility: CLINIC | Age: 48
End: 2018-08-06

## 2018-08-06 NOTE — TELEPHONE ENCOUNTER
Patient LM on nurse line stating that she saw Jerry Méndez last Friday and she referred her to pain mgmt  Patient states that she is experiencing "fibromyalgia symptoms", and when she called pain management, they told her that "they do not treat fibromyalgia symptoms"  Would you like me to place a referral for rheumatology or would you like patient to schedule visit with pain management? Please advise  Thanks

## 2018-08-06 NOTE — TELEPHONE ENCOUNTER
When Ms Edison Rios was seen in office on 8/3/18 she was offered referral to [facility] but patient declined referral  (Patient reported she preferred at that point to discuss option for Pain Management referral with her Psychiatrist)  Patient had mentioned she had seen seen a Rheumatologist in past too  May provide Ms Ramirez with referral for Pain Management evaluation if she wishes  We typically refer to [facility] but she may want to consider seeing a Pain Management provider elsewhere if [facility] does not manage patient's with Fibromyalgia  Please touch base with [facility] to inquire if they manage patient's with fibromyalgia and updated patient  She may want to follow up with her prior Rheumatologist too if she has concerns for Fibromyalgia management  Please relay to Ms  Earlineravi  Thank you

## 2018-08-07 NOTE — TELEPHONE ENCOUNTER
Attempted to reach patient to give her the following recommendations with no answer  LMOM for patient to call back at her convenience

## 2018-08-13 ENCOUNTER — APPOINTMENT (EMERGENCY)
Dept: CT IMAGING | Facility: HOSPITAL | Age: 48
End: 2018-08-13
Payer: COMMERCIAL

## 2018-08-13 ENCOUNTER — HOSPITAL ENCOUNTER (EMERGENCY)
Facility: HOSPITAL | Age: 48
Discharge: HOME | End: 2018-08-13
Attending: EMERGENCY MEDICINE | Admitting: EMERGENCY MEDICINE
Payer: COMMERCIAL

## 2018-08-13 VITALS
DIASTOLIC BLOOD PRESSURE: 67 MMHG | TEMPERATURE: 98.3 F | BODY MASS INDEX: 37.24 KG/M2 | HEART RATE: 60 BPM | HEIGHT: 66 IN | SYSTOLIC BLOOD PRESSURE: 146 MMHG | WEIGHT: 231.7 LBS | RESPIRATION RATE: 16 BRPM | OXYGEN SATURATION: 98 %

## 2018-08-13 DIAGNOSIS — S09.90XA MINOR HEAD INJURY, INITIAL ENCOUNTER: Primary | ICD-10-CM

## 2018-08-13 PROCEDURE — 70450 CT HEAD/BRAIN W/O DYE: CPT

## 2018-08-13 PROCEDURE — 99284 EMERGENCY DEPT VISIT MOD MDM: CPT

## 2018-08-13 PROCEDURE — 72125 CT NECK SPINE W/O DYE: CPT

## 2018-08-13 RX ORDER — IBUPROFEN 400 MG/1
400 TABLET ORAL ONCE
Status: COMPLETED | OUTPATIENT
Start: 2018-08-13 | End: 2018-08-13

## 2018-08-13 RX ADMIN — IBUPROFEN 400 MG: 400 TABLET ORAL at 05:22

## 2018-08-13 NOTE — DISCHARGE INSTRUCTIONS
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
## 2018-09-25 DIAGNOSIS — R51.9 FREQUENT HEADACHES: ICD-10-CM

## 2018-09-25 RX ORDER — BUTALBITAL, ACETAMINOPHEN AND CAFFEINE 50; 325; 40 MG/1; MG/1; MG/1
1 TABLET ORAL DAILY PRN
Qty: 30 TABLET | Refills: 0 | Status: SHIPPED | OUTPATIENT
Start: 2018-09-25 | End: 2019-09-23 | Stop reason: SDUPTHER

## 2018-10-05 LAB
CREAT ?TM UR-SCNC: 154 UMOL/L
MICROALBUMIN UR-MCNC: 1.2 MG/L (ref 0–20)
MICROALBUMIN/CREAT UR: 7.8 MG/G{CREAT}

## 2018-10-10 ENCOUNTER — OFFICE VISIT (OUTPATIENT)
Dept: NEPHROLOGY | Facility: CLINIC | Age: 48
End: 2018-10-10
Payer: COMMERCIAL

## 2018-10-10 VITALS
SYSTOLIC BLOOD PRESSURE: 128 MMHG | WEIGHT: 231 LBS | DIASTOLIC BLOOD PRESSURE: 80 MMHG | BODY MASS INDEX: 37.12 KG/M2 | HEIGHT: 66 IN

## 2018-10-10 DIAGNOSIS — R79.89 ELEVATED SERUM CREATININE: Primary | ICD-10-CM

## 2018-10-10 PROCEDURE — 99213 OFFICE O/P EST LOW 20 MIN: CPT | Performed by: INTERNAL MEDICINE

## 2018-10-10 RX ORDER — AMITRIPTYLINE HYDROCHLORIDE 25 MG/1
TABLET, FILM COATED ORAL
COMMUNITY
End: 2018-12-18 | Stop reason: HOSPADM
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
## 2018-11-30 ENCOUNTER — HOSPITAL ENCOUNTER (OUTPATIENT)
Dept: MAMMOGRAPHY | Facility: CLINIC | Age: 48
Discharge: HOME | End: 2018-11-30
Payer: COMMERCIAL

## 2018-11-30 ENCOUNTER — HOSPITAL ENCOUNTER (OUTPATIENT)
Dept: ULTRASOUND IMAGING | Facility: CLINIC | Age: 48
Discharge: HOME | End: 2018-11-30
Payer: COMMERCIAL

## 2018-11-30 VITALS — HEIGHT: 66 IN | WEIGHT: 225 LBS | BODY MASS INDEX: 36.16 KG/M2

## 2018-11-30 DIAGNOSIS — Z12.31 VISIT FOR SCREENING MAMMOGRAM: ICD-10-CM

## 2018-11-30 DIAGNOSIS — R92.8 ABNORMAL MAMMOGRAM: ICD-10-CM

## 2018-11-30 DIAGNOSIS — N63.10 LUMP OF RIGHT BREAST: ICD-10-CM

## 2018-11-30 DIAGNOSIS — R92.8 ABNORMAL MAMMOGRAM: Primary | ICD-10-CM

## 2018-11-30 PROCEDURE — G0279 TOMOSYNTHESIS, MAMMO: HCPCS

## 2018-11-30 PROCEDURE — 76642 ULTRASOUND BREAST LIMITED: CPT

## 2018-11-30 PROCEDURE — 77066 DX MAMMO INCL CAD BI: CPT

## 2018-11-30 NOTE — PROGRESS NOTES
Met with patient and Dr Mukesh Al regarding recommendation for;      _____ RIGHT ___x___LEFT      __x___Ultrasound guided  ______Stereotactic  Breast biopsy  __x___Verbalized understanding        Blood thinners:  _____yes __x___no    Date stopped: ____n/a_______    Biopsy teaching sheet given:  ____x___yes ______no

## 2018-12-05 ENCOUNTER — HOSPITAL ENCOUNTER (OUTPATIENT)
Dept: ULTRASOUND IMAGING | Facility: CLINIC | Age: 48
Discharge: HOME | End: 2018-12-05
Payer: COMMERCIAL

## 2018-12-05 ENCOUNTER — HOSPITAL ENCOUNTER (OUTPATIENT)
Dept: MAMMOGRAPHY | Facility: CLINIC | Age: 48
Discharge: HOME | End: 2018-12-05

## 2018-12-05 VITALS — HEART RATE: 80 BPM | SYSTOLIC BLOOD PRESSURE: 122 MMHG | DIASTOLIC BLOOD PRESSURE: 64 MMHG

## 2018-12-05 DIAGNOSIS — R92.8 ABNORMAL MAMMOGRAM: ICD-10-CM

## 2018-12-05 PROCEDURE — 88342 IMHCHEM/IMCYTCHM 1ST ANTB: CPT | Performed by: PATHOLOGY

## 2018-12-05 PROCEDURE — 88305 TISSUE EXAM BY PATHOLOGIST: CPT | Performed by: PATHOLOGY

## 2018-12-05 PROCEDURE — 19083 BX BREAST 1ST LESION US IMAG: CPT

## 2018-12-05 PROCEDURE — 88341 IMHCHEM/IMCYTCHM EA ADD ANTB: CPT | Performed by: PATHOLOGY

## 2018-12-05 RX ORDER — LIDOCAINE HYDROCHLORIDE 10 MG/ML
4 INJECTION, SOLUTION INFILTRATION; PERINEURAL ONCE
Status: COMPLETED | OUTPATIENT
Start: 2018-12-05 | End: 2018-12-05

## 2018-12-05 RX ADMIN — LIDOCAINE HYDROCHLORIDE 4 ML: 10 INJECTION, SOLUTION INFILTRATION; PERINEURAL at 14:55

## 2018-12-05 NOTE — DISCHARGE INSTR - OTHER ORDERS
POST LARGE CORE BREAST BIOPSY PATIENT INFORMATION      1  Place an ice pack inside your bra over the top of the dressing every hour for 20 minutes (20 minutes on, 60 minutes off)  Do this until bedtime  2  Do not shower or bathe until the following morning  3  You may bathe your breast carefully with the steri-strips in place  Be careful    Not to loosen them  The steri-strips will fall off in 3-5 days  4  You may have mild discomfort, and you may have some bruising where the   Needle entered the skin  This should clear within 5-7 days  5  If you need medicine for discomfort, take acetaminophen products such as   Tylenol  You may also take Advil or Motrin products  6  Do not participate in strenuous activities such as-tennis, aerobics, skiing,  Weight lifting, etc  for 24 hours  Refrain from swimming/soaking for 72 hours  7  Wearing a bra for sleeping may be more comfortable for the first 24-48 hours  8  Watch for continued bleeding, pain or fever over 101; please call with any questions or concerns  For procedures done at the [facility]  [facility] call:  Rashaad Caraballo RN at [phone]  Celio Burger RN at [phone]                    *After 4 PM call the Interventional Radiology Department                    [phone] and ask to speak with the nurse on call  For procedures done at the [address] [place] [address] call:         Selena Fry RN at   *After 4 PM call the Interventional Radiology Department   [phone] and ask to speak with the nurse on call  For procedures done at [address] [place] [address] call: The Radiology Nurse at [phone]  *After 4 PM call your physician, or go to the Emergency Department  9          The final results of your biopsy are usually available within one week

## 2018-12-05 NOTE — PROGRESS NOTES
Ice pack given:    ___x__yes _____no    Discharge instructions signed by patient:    __x___yes _____no    Discharge instructions given to patient:    __x___yes _____no    Discharged via:    __x___amulatory (with cane)    _____wheelchair    _____stretcher    Stable on discharge:    __x___yes ____no
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
## 2018-12-05 NOTE — PROGRESS NOTES
Procedure type:    __x___ultrasound guided _____stereotactic    Breast:    __x___Left _____Right    Location: 8-9:00 8cm from nipple    Needle: 12g Carole    # of passes: 4    Clip: Ultraclip-ribbon    Performed by: Dr Shakir Morrison held for 5 minutes by: Isamar Cabrera Strips:    __x___yes _____no    Band aid:    __x___yes_____no    Tape and guaze:    _____yes __x___no    Tolerated procedure:    __x___yes _____no

## 2018-12-07 NOTE — PROGRESS NOTES
Post procedure call completed on 12/7/18 1320      Bleeding: _____yes __x___no    Pain: _____yes ___x___no    Redness/Swelling: ______yes ___x___no    Band aid removed: __x___yes _____no    Steri-Strips intact: ___x___yes _____no

## 2018-12-12 ENCOUNTER — TELEPHONE (OUTPATIENT)
Dept: SURGICAL ONCOLOGY | Facility: CLINIC | Age: 48
End: 2018-12-12

## 2018-12-12 NOTE — TELEPHONE ENCOUNTER
Pt had called office for her left breast biopsy results  Spoke with Dr Renan Briones, results showed no signs of cancer  Pt was informed, however she has an appt scheduled next Tuesday and Dr Renan Briones will go over her report and recommendations at that time

## 2018-12-12 NOTE — TELEPHONE ENCOUNTER
----- Message from [place] sent at 12/12/2018  9:18 AM EST -----  Regarding: Patient would like results  Contact: [phone]  Please call pt she would like to know results from test

## 2018-12-18 ENCOUNTER — OFFICE VISIT (OUTPATIENT)
Dept: SURGICAL ONCOLOGY | Facility: CLINIC | Age: 48
End: 2018-12-18
Payer: COMMERCIAL

## 2018-12-18 VITALS
DIASTOLIC BLOOD PRESSURE: 86 MMHG | RESPIRATION RATE: 14 BRPM | WEIGHT: 229 LBS | SYSTOLIC BLOOD PRESSURE: 120 MMHG | HEIGHT: 66 IN | TEMPERATURE: 98 F | HEART RATE: 78 BPM | BODY MASS INDEX: 36.8 KG/M2

## 2018-12-18 DIAGNOSIS — N64.89 RADIAL SCAR OF BREAST: Primary | ICD-10-CM

## 2018-12-18 DIAGNOSIS — N60.92 ATYPICAL HYPERPLASIA OF LEFT BREAST: ICD-10-CM

## 2018-12-18 DIAGNOSIS — R92.8 ABNORMAL FINDING ON BREAST IMAGING: ICD-10-CM

## 2018-12-18 PROBLEM — N63.0 BREAST LUMP: Status: RESOLVED | Noted: 2018-12-18 | Resolved: 2018-12-18

## 2018-12-18 PROCEDURE — 99215 OFFICE O/P EST HI 40 MIN: CPT | Performed by: SURGERY

## 2018-12-18 RX ORDER — CEFAZOLIN SODIUM 2 G/50ML
2000 SOLUTION INTRAVENOUS ONCE
Status: CANCELLED | OUTPATIENT
Start: 2018-12-28

## 2018-12-18 RX ORDER — TRAMADOL HYDROCHLORIDE 50 MG/1
50 TABLET ORAL EVERY 6 HOURS PRN
Qty: 20 TABLET | Refills: 0 | Status: SHIPPED | OUTPATIENT
Start: 2018-12-18 | End: 2019-11-13
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
## 2018-12-19 ENCOUNTER — ANESTHESIA EVENT (OUTPATIENT)
Dept: PERIOP | Facility: HOSPITAL | Age: 48
End: 2018-12-19
Payer: COMMERCIAL

## 2018-12-20 ENCOUNTER — HOSPITAL ENCOUNTER (OUTPATIENT)
Dept: RADIOLOGY | Facility: HOSPITAL | Age: 48
Discharge: HOME | End: 2018-12-20
Attending: SURGERY
Payer: COMMERCIAL

## 2018-12-20 ENCOUNTER — HOSPITAL ENCOUNTER (OUTPATIENT)
Dept: NON INVASIVE DIAGNOSTICS | Facility: HOSPITAL | Age: 48
Discharge: HOME | End: 2018-12-20
Attending: SURGERY
Payer: COMMERCIAL

## 2018-12-20 ENCOUNTER — APPOINTMENT (OUTPATIENT)
Dept: LAB | Facility: HOSPITAL | Age: 48
End: 2018-12-20
Attending: SURGERY
Payer: COMMERCIAL

## 2018-12-20 DIAGNOSIS — N64.89 RADIAL SCAR OF BREAST: ICD-10-CM

## 2018-12-20 DIAGNOSIS — N60.92 ATYPICAL HYPERPLASIA OF LEFT BREAST: ICD-10-CM

## 2018-12-20 DIAGNOSIS — R92.8 ABNORMAL FINDING ON BREAST IMAGING: ICD-10-CM

## 2018-12-20 LAB
ALBUMIN SERPL BCP-MCNC: 3.8 G/DL (ref 3.5–5)
ALP SERPL-CCNC: 64 U/L (ref 46–116)
ALT SERPL W P-5'-P-CCNC: 22 U/L (ref 12–78)
ANION GAP SERPL CALCULATED.3IONS-SCNC: 8 MMOL/L (ref 4–13)
APTT PPP: 30 SECONDS (ref 26–38)
AST SERPL W P-5'-P-CCNC: 18 U/L (ref 5–45)
BACTERIA UR QL AUTO: NORMAL /HPF
BASOPHILS # BLD AUTO: 0.02 THOUSANDS/ΜL (ref 0–0.1)
BASOPHILS NFR BLD AUTO: 0 % (ref 0–1)
BILIRUB SERPL-MCNC: 0.29 MG/DL (ref 0.2–1)
BILIRUB UR QL STRIP: NEGATIVE
BUN SERPL-MCNC: 13 MG/DL (ref 5–25)
CALCIUM SERPL-MCNC: 8.9 MG/DL (ref 8.3–10.1)
CHLORIDE SERPL-SCNC: 103 MMOL/L (ref 100–108)
CLARITY UR: CLEAR
CO2 SERPL-SCNC: 28 MMOL/L (ref 21–32)
COLOR UR: YELLOW
CREAT SERPL-MCNC: 1.19 MG/DL (ref 0.6–1.3)
EOSINOPHIL # BLD AUTO: 0.13 THOUSAND/ΜL (ref 0–0.61)
EOSINOPHIL NFR BLD AUTO: 2 % (ref 0–6)
ERYTHROCYTE [DISTWIDTH] IN BLOOD BY AUTOMATED COUNT: 13.9 % (ref 11.6–15.1)
GFR SERPL CREATININE-BSD FRML MDRD: 63 ML/MIN/1.73SQ M
GLUCOSE SERPL-MCNC: 121 MG/DL (ref 65–140)
GLUCOSE UR STRIP-MCNC: ABNORMAL MG/DL
HCT VFR BLD AUTO: 40.8 % (ref 34.8–46.1)
HGB BLD-MCNC: 12.9 G/DL (ref 11.5–15.4)
HGB UR QL STRIP.AUTO: NEGATIVE
IMM GRANULOCYTES # BLD AUTO: 0.02 THOUSAND/UL (ref 0–0.2)
IMM GRANULOCYTES NFR BLD AUTO: 0 % (ref 0–2)
INR PPP: 0.99 (ref 0.86–1.17)
KETONES UR STRIP-MCNC: NEGATIVE MG/DL
LEUKOCYTE ESTERASE UR QL STRIP: ABNORMAL
LYMPHOCYTES # BLD AUTO: 2.12 THOUSANDS/ΜL (ref 0.6–4.47)
LYMPHOCYTES NFR BLD AUTO: 39 % (ref 14–44)
MCH RBC QN AUTO: 27.3 PG (ref 26.8–34.3)
MCHC RBC AUTO-ENTMCNC: 31.6 G/DL (ref 31.4–37.4)
MCV RBC AUTO: 86 FL (ref 82–98)
MONOCYTES # BLD AUTO: 0.3 THOUSAND/ΜL (ref 0.17–1.22)
MONOCYTES NFR BLD AUTO: 5 % (ref 4–12)
NEUTROPHILS # BLD AUTO: 2.92 THOUSANDS/ΜL (ref 1.85–7.62)
NEUTS SEG NFR BLD AUTO: 54 % (ref 43–75)
NITRITE UR QL STRIP: NEGATIVE
NON-SQ EPI CELLS URNS QL MICRO: NORMAL /HPF
NRBC BLD AUTO-RTO: 0 /100 WBCS
PH UR STRIP.AUTO: 5.5 [PH] (ref 4.5–8)
PLATELET # BLD AUTO: 273 THOUSANDS/UL (ref 149–390)
PMV BLD AUTO: 9.9 FL (ref 8.9–12.7)
POTASSIUM SERPL-SCNC: 3.6 MMOL/L (ref 3.5–5.3)
PROT SERPL-MCNC: 8.3 G/DL (ref 6.4–8.2)
PROT UR STRIP-MCNC: NEGATIVE MG/DL
PROTHROMBIN TIME: 13.2 SECONDS (ref 11.8–14.2)
RBC # BLD AUTO: 4.72 MILLION/UL (ref 3.81–5.12)
RBC #/AREA URNS AUTO: NORMAL /HPF
SODIUM SERPL-SCNC: 139 MMOL/L (ref 136–145)
SP GR UR STRIP.AUTO: 1.01 (ref 1–1.03)
UROBILINOGEN UR QL STRIP.AUTO: 0.2 E.U./DL
WBC # BLD AUTO: 5.51 THOUSAND/UL (ref 4.31–10.16)
WBC #/AREA URNS AUTO: NORMAL /HPF

## 2018-12-20 PROCEDURE — 93005 ELECTROCARDIOGRAM TRACING: CPT

## 2018-12-20 PROCEDURE — 85730 THROMBOPLASTIN TIME PARTIAL: CPT

## 2018-12-20 PROCEDURE — 81001 URINALYSIS AUTO W/SCOPE: CPT | Performed by: SURGERY

## 2018-12-20 PROCEDURE — 71046 X-RAY EXAM CHEST 2 VIEWS: CPT

## 2018-12-20 PROCEDURE — 85610 PROTHROMBIN TIME: CPT

## 2018-12-20 PROCEDURE — 85025 COMPLETE CBC W/AUTO DIFF WBC: CPT

## 2018-12-20 PROCEDURE — 80053 COMPREHEN METABOLIC PANEL: CPT

## 2018-12-20 PROCEDURE — 36415 COLL VENOUS BLD VENIPUNCTURE: CPT

## 2018-12-20 RX ORDER — CALCIUM CARBONATE 200(500)MG
1 TABLET,CHEWABLE ORAL AS NEEDED
COMMUNITY

## 2018-12-20 NOTE — PRE-PROCEDURE INSTRUCTIONS
Pre-Surgery Instructions:   Medication Instructions    amitriptyline (ELAVIL) 25 mg tablet Instructed patient per Anesthesia Guidelines   buPROPion (WELLBUTRIN XL) 150 mg 24 hr tablet Instructed patient per Anesthesia Guidelines   butalbital-acetaminophen-caffeine (FIORICET,ESGIC) -40 mg per tablet Instructed patient per Anesthesia Guidelines   calcium carbonate (TUMS) 500 mg chewable tablet Instructed patient per Anesthesia Guidelines   cholecalciferol (VITAMIN D3) 1,000 units tablet Instructed patient per Anesthesia Guidelines   doxepin (SINEquan) 10 mg capsule Instructed patient per Anesthesia Guidelines   Empagliflozin-Linagliptin (GLYXAMBI) 10-5 MG TABS Instructed patient per Anesthesia Guidelines   hydrOXYzine HCL (ATARAX) 25 mg tablet Instructed patient per Anesthesia Guidelines   LORazepam (ATIVAN) 0 5 mg tablet Instructed patient per Anesthesia Guidelines   multivitamin (THERAGRAN) TABS Instructed patient per Anesthesia Guidelines   NON FORMULARY Instructed patient per Anesthesia Guidelines  Instructed to take atarax and wellbutrin am of surgery with sip of water per anesthesia DR Analy Lucas

## 2018-12-20 NOTE — ANESTHESIA PREPROCEDURE EVALUATION
Review of Systems/Medical History          Cardiovascular   Pulmonary  Shortness of breath,        GI/Hepatic    GERD well controlled, Liver disease (biliary) , cirrhosis,             Endo/Other  Diabetes well controlled type 2 Oral agent,   Obesity    GYN       Hematology   Musculoskeletal    Arthritis     Neurology    Headaches, Fibromyalgia (Uses medical marijuana for pain)   Psychology   Anxiety, Depression , being treated for depression,              Physical Exam    Airway    Mallampati score: II  TM Distance: >3 FB  Neck ROM: full     Dental       Cardiovascular  Rhythm: regular, Rate: normal, Cardiovascular exam normal    Pulmonary  Pulmonary exam normal Breath sounds clear to auscultation,     Other Findings        Anesthesia Plan  ASA Score- 3     Anesthesia Type- general with ASA Monitors  Additional Monitors:   Airway Plan: LMA  Plan Factors-Patient not instructed to abstain from smoking on day of procedure       Induction- intravenous  Postoperative Plan-     Informed Consent- Anesthetic plan and risks discussed with patient

## 2018-12-21 LAB
ATRIAL RATE: 73 BPM
P AXIS: 66 DEGREES
PR INTERVAL: 158 MS
QRS AXIS: -41 DEGREES
QRSD INTERVAL: 104 MS
QT INTERVAL: 388 MS
QTC INTERVAL: 427 MS
T WAVE AXIS: 25 DEGREES
VENTRICULAR RATE: 73 BPM

## 2018-12-21 PROCEDURE — 93010 ELECTROCARDIOGRAM REPORT: CPT | Performed by: INTERNAL MEDICINE

## 2018-12-27 ENCOUNTER — TELEPHONE (OUTPATIENT)
Dept: NEPHROLOGY | Facility: CLINIC | Age: 48
End: 2018-12-27

## 2018-12-27 NOTE — TELEPHONE ENCOUNTER
I called and left message for patient to call back to schedule February follow up appt with Dr Ame Schneider

## 2018-12-28 ENCOUNTER — HOSPITAL ENCOUNTER (OUTPATIENT)
Dept: MAMMOGRAPHY | Facility: HOSPITAL | Age: 48
Discharge: HOME | End: 2018-12-28
Attending: SURGERY
Payer: COMMERCIAL

## 2018-12-28 ENCOUNTER — ANESTHESIA (OUTPATIENT)
Dept: PERIOP | Facility: HOSPITAL | Age: 48
End: 2018-12-28
Payer: COMMERCIAL

## 2018-12-28 ENCOUNTER — HOSPITAL ENCOUNTER (OUTPATIENT)
Dept: MAMMOGRAPHY | Facility: HOSPITAL | Age: 48
Setting detail: OUTPATIENT SURGERY
Discharge: HOME | End: 2018-12-28
Payer: COMMERCIAL

## 2018-12-28 ENCOUNTER — HOSPITAL ENCOUNTER (OUTPATIENT)
Facility: HOSPITAL | Age: 48
Setting detail: OUTPATIENT SURGERY
Discharge: HOME | End: 2018-12-28
Attending: SURGERY | Admitting: SURGERY
Payer: COMMERCIAL

## 2018-12-28 VITALS
SYSTOLIC BLOOD PRESSURE: 111 MMHG | HEART RATE: 75 BPM | DIASTOLIC BLOOD PRESSURE: 58 MMHG | BODY MASS INDEX: 37.48 KG/M2 | OXYGEN SATURATION: 98 % | HEIGHT: 66 IN | TEMPERATURE: 98.5 F | WEIGHT: 233.2 LBS | RESPIRATION RATE: 18 BRPM

## 2018-12-28 DIAGNOSIS — N60.92 ATYPICAL HYPERPLASIA OF LEFT BREAST: ICD-10-CM

## 2018-12-28 DIAGNOSIS — N64.89 RADIAL SCAR OF BREAST: ICD-10-CM

## 2018-12-28 DIAGNOSIS — R92.8 ABNORMAL FINDING ON BREAST IMAGING: ICD-10-CM

## 2018-12-28 LAB
EXT PREGNANCY TEST URINE: NEGATIVE
GLUCOSE SERPL-MCNC: 142 MG/DL (ref 65–140)

## 2018-12-28 PROCEDURE — 19125 EXCISION BREAST LESION: CPT | Performed by: SURGERY

## 2018-12-28 PROCEDURE — 88307 TISSUE EXAM BY PATHOLOGIST: CPT | Performed by: PATHOLOGY

## 2018-12-28 PROCEDURE — 19281 PERQ DEVICE BREAST 1ST IMAG: CPT

## 2018-12-28 PROCEDURE — 81025 URINE PREGNANCY TEST: CPT | Performed by: ANESTHESIOLOGY

## 2018-12-28 PROCEDURE — 82948 REAGENT STRIP/BLOOD GLUCOSE: CPT

## 2018-12-28 RX ORDER — FENTANYL CITRATE/PF 50 MCG/ML
25 SYRINGE (ML) INJECTION
Status: DISCONTINUED | OUTPATIENT
Start: 2018-12-28 | End: 2018-12-28 | Stop reason: HOSPADM

## 2018-12-28 RX ORDER — ONDANSETRON 2 MG/ML
INJECTION INTRAMUSCULAR; INTRAVENOUS AS NEEDED
Status: DISCONTINUED | OUTPATIENT
Start: 2018-12-28 | End: 2018-12-28 | Stop reason: SURG

## 2018-12-28 RX ORDER — BUPIVACAINE HYDROCHLORIDE 5 MG/ML
INJECTION, SOLUTION PERINEURAL AS NEEDED
Status: DISCONTINUED | OUTPATIENT
Start: 2018-12-28 | End: 2018-12-28 | Stop reason: HOSPADM

## 2018-12-28 RX ORDER — IBUPROFEN 600 MG/1
600 TABLET ORAL EVERY 6 HOURS PRN
Status: DISCONTINUED | OUTPATIENT
Start: 2018-12-28 | End: 2018-12-28 | Stop reason: HOSPADM

## 2018-12-28 RX ORDER — MIDAZOLAM HYDROCHLORIDE 1 MG/ML
INJECTION INTRAMUSCULAR; INTRAVENOUS AS NEEDED
Status: DISCONTINUED | OUTPATIENT
Start: 2018-12-28 | End: 2018-12-28 | Stop reason: SURG

## 2018-12-28 RX ORDER — FENTANYL CITRATE 50 UG/ML
INJECTION, SOLUTION INTRAMUSCULAR; INTRAVENOUS AS NEEDED
Status: DISCONTINUED | OUTPATIENT
Start: 2018-12-28 | End: 2018-12-28 | Stop reason: SURG

## 2018-12-28 RX ORDER — TRAMADOL HYDROCHLORIDE 50 MG/1
50 TABLET ORAL EVERY 6 HOURS PRN
Status: DISCONTINUED | OUTPATIENT
Start: 2018-12-28 | End: 2018-12-28 | Stop reason: HOSPADM

## 2018-12-28 RX ORDER — ALBUTEROL SULFATE 2.5 MG/3ML
2.5 SOLUTION RESPIRATORY (INHALATION) ONCE AS NEEDED
Status: DISCONTINUED | OUTPATIENT
Start: 2018-12-28 | End: 2018-12-28 | Stop reason: HOSPADM

## 2018-12-28 RX ORDER — LIDOCAINE WITH 8.4% SOD BICARB 0.9%(10ML)
5 SYRINGE (ML) INJECTION ONCE
Status: COMPLETED | OUTPATIENT
Start: 2018-12-28 | End: 2018-12-28

## 2018-12-28 RX ORDER — ONDANSETRON 2 MG/ML
4 INJECTION INTRAMUSCULAR; INTRAVENOUS ONCE AS NEEDED
Status: COMPLETED | OUTPATIENT
Start: 2018-12-28 | End: 2018-12-28

## 2018-12-28 RX ORDER — CEFAZOLIN SODIUM 2 G/50ML
2000 SOLUTION INTRAVENOUS ONCE
Status: COMPLETED | OUTPATIENT
Start: 2018-12-28 | End: 2018-12-28

## 2018-12-28 RX ORDER — SODIUM CHLORIDE 9 MG/ML
125 INJECTION, SOLUTION INTRAVENOUS CONTINUOUS
Status: DISCONTINUED | OUTPATIENT
Start: 2018-12-28 | End: 2018-12-28 | Stop reason: HOSPADM

## 2018-12-28 RX ORDER — DEXAMETHASONE SODIUM PHOSPHATE 4 MG/ML
INJECTION, SOLUTION INTRA-ARTICULAR; INTRALESIONAL; INTRAMUSCULAR; INTRAVENOUS; SOFT TISSUE AS NEEDED
Status: DISCONTINUED | OUTPATIENT
Start: 2018-12-28 | End: 2018-12-28 | Stop reason: SURG

## 2018-12-28 RX ORDER — PROPOFOL 10 MG/ML
INJECTION, EMULSION INTRAVENOUS AS NEEDED
Status: DISCONTINUED | OUTPATIENT
Start: 2018-12-28 | End: 2018-12-28 | Stop reason: SURG

## 2018-12-28 RX ADMIN — MIDAZOLAM 2 MG: 1 INJECTION INTRAMUSCULAR; INTRAVENOUS at 08:54

## 2018-12-28 RX ADMIN — CEFAZOLIN SODIUM 2000 MG: 2 SOLUTION INTRAVENOUS at 09:02

## 2018-12-28 RX ADMIN — LIDOCAINE HYDROCHLORIDE 5 ML: 10 INJECTION, SOLUTION INFILTRATION; PERINEURAL at 08:22

## 2018-12-28 RX ADMIN — ONDANSETRON 4 MG: 2 INJECTION INTRAMUSCULAR; INTRAVENOUS at 10:21

## 2018-12-28 RX ADMIN — LIDOCAINE HYDROCHLORIDE 80 MG: 20 INJECTION, SOLUTION INTRAVENOUS at 09:00

## 2018-12-28 RX ADMIN — ONDANSETRON 4 MG: 2 INJECTION INTRAMUSCULAR; INTRAVENOUS at 08:54

## 2018-12-28 RX ADMIN — SODIUM CHLORIDE 125 ML/HR: 0.9 INJECTION, SOLUTION INTRAVENOUS at 07:40

## 2018-12-28 RX ADMIN — TRAMADOL HYDROCHLORIDE 50 MG: 50 TABLET, COATED ORAL at 12:02

## 2018-12-28 RX ADMIN — SODIUM CHLORIDE: 0.9 INJECTION, SOLUTION INTRAVENOUS at 09:58

## 2018-12-28 RX ADMIN — IBUPROFEN 600 MG: 600 TABLET ORAL at 13:11

## 2018-12-28 RX ADMIN — FENTANYL CITRATE 25 MCG: 50 INJECTION, SOLUTION INTRAMUSCULAR; INTRAVENOUS at 09:05

## 2018-12-28 RX ADMIN — DEXAMETHASONE SODIUM PHOSPHATE 4 MG: 4 INJECTION, SOLUTION INTRAMUSCULAR; INTRAVENOUS at 09:06

## 2018-12-28 RX ADMIN — FENTANYL CITRATE 25 MCG: 50 INJECTION, SOLUTION INTRAMUSCULAR; INTRAVENOUS at 09:42

## 2018-12-28 RX ADMIN — PROPOFOL 180 MG: 10 INJECTION, EMULSION INTRAVENOUS at 09:00

## 2018-12-28 RX ADMIN — FENTANYL CITRATE 50 MCG: 50 INJECTION, SOLUTION INTRAMUSCULAR; INTRAVENOUS at 09:35

## 2018-12-28 NOTE — ANESTHESIA POSTPROCEDURE EVALUATION
Post-Op Assessment Note      CV Status:  Stable    Mental Status:  Alert and awake    Hydration Status:  Euvolemic    PONV Controlled:  Controlled    Airway Patency:  Patent    Post Op Vitals Reviewed: Yes          Staff: Anesthesiologist           /70 (12/28/18 1044)    Temp 98 5 °F (36 9 °C) (12/28/18 1044)    Pulse 62 (12/28/18 1044)   Resp 17 (12/28/18 1044)    SpO2 97 % (12/28/18 1044)

## 2018-12-28 NOTE — DISCHARGE INSTRUCTIONS
POST-OPERATIVE CARE INSTRUCTIONS       Care after your procedure:   General  · Rest and relax for 24 hours, then gradually return to normal activities  · Do not preform any heavy lifting or strenuous physical activities for 14 days  · Your activity restrictions will be re-evaluated at your post op visit  · Drink clear liquids until you are certain there is no nausea, then resume a normal diet  · Do not drink alcohol, drive any vehicle, operate mechanical equipment or make critical decisions for at least 24 hours and until you are off any narcotic pain medications  The Incision  · Your incision is closed with:   dissolvable stiches just underneath the skin  · The incision is also covered with:                          clear waterproof glue  · A gauze-pad is covering the wound  Wound care  · Remove your gauze-pad after 24 hours  · You may then shower using soap and water to clean your incision  Gently dry the wound  · You may redress your wound with additional gauze and tape if you choose  · A little bruising at the wound site is normal     Medication  · Resume all previous medications  · Take either Naproxen (Aleve) one tablet every 8 hours or Ibuprofen(Advil/Motrin) one(1) to two(2) tablets every 6 hours around the clock for the first 2-3 days  Take this even if you don't think you need it for at least the first 24 hours  · Pain Medication Instructions: tramadol as prescribed          Other (If applicable)  · Wear a post-surgical bra around the clock  · May use ice to the incision site(s) for the next 24-48 hours, twice daily     Call your  doctor if you have any of the following:  · Redness, swelling, heat, drainage, and/or bleeding from your wound  · Chills or fever ( above 101' F )  · Pain, not relieved with the above medications  · If you have any questions or problems call our office [phone]    Follow-up appointment:  · As scheduled

## 2018-12-28 NOTE — OP NOTE
OPERATIVE REPORT  PATIENT NAME: Maryann Faye    :  1970  MRN: [id number]  Pt Location: [facility] OR ROOM 07    SURGERY DATE: 2018    Surgeon(s) and Role:     * Luis Cheng MD - Primary    Preop Diagnosis:  Radial scar of breast [N64 89]  Atypical hyperplasia of left breast [N62]  Abnormal finding on breast imaging [R92 8]    Post-Op Diagnosis Codes:     * Radial scar of breast [N64 89]     * Atypical hyperplasia of left breast [N62]     * Abnormal finding on breast imaging [R92 8]    Procedure(s) (LRB):  Breast needle localization excisional biopsy (Left)    Specimen(s):  ID Type Source Tests Collected by Time Destination   1 : LEFT BREAST TISSUE  N L   SUTURE  SHORT  SUPERIOR  L;MANN  LATERAL Tissue Breast, Left TISSUE EXAM Luis Chegn MD 2018 [id number]    2 : NEW LEFT LATERAL MARGIN  SUTURE TRUE MARGIN Tissue Breast, Left TISSUE EXAM Luis Cheng MD 2018 4710    3 : NEW LEFT INFERIOR MARGIN  SUTURE TRUE MARGIN Tissue Breast, Left TISSUE EXAM Luis Cheng MD 2018 0003        Estimated Blood Loss:   Minimal    Drains:       Anesthesia Type:   General    Operative Indications:  Radial scar of breast [N64 89]  Atypical hyperplasia of left breast [N62]  Abnormal finding on breast imaging [R92 8]      Operative Findings:  Clip and wire in specimen    Complications:   None    Procedure and Technique:  Ben Craft is a 26-year-old female who presented with an abnormal mammogram of the left breast   Her biopsy revealed atypical hyperplasia as well as a radial scar  She was counseled on surgical excision  She presented the day of surgery to the radiology suite and underwent needle localization of the left breast   From there she went to the operating room  She was given general anesthesia  She was prepped and draped in usual standard fashion  She did preoperative antibiotics  Time-out was performed    Attention was turned to the medial aspect of the left breast   0 5% Marcaine plain was used for supplemental anesthesia  A medial circumareolar incision was created through the skin and subcutaneous tissue  Electrocautery was then used to dissect further medial and slightly inferior in the breast to the level of the localization wire, which was then elevated into the wound  A margin of tissue was excised all the way down to and including the wire tip  This was marked with a short stitch superior and a long stitch lateral   This was imaged in the operating room revealing the intact wire as well as prior biopsy clip  There were calcifications noted slightly inferior and medial on the specimen radiograph  Therefore new margins were excised in these locations and sutures were placed on the true margins  Her wound was then irrigated and hemostasis was achieved  The wound was then closed in a layered fashion using interrupted 3-0 Monocryl suture and a running 4-0 Monocryl subcuticular stitch  The skin was cleaned and dried  Surgical glue, fluffs and a bra were applied  All counts were correct  Patient was extubated and taken to recovery in stable condition        Patient Disposition:  extubated and stable    SIGNATURE: Dylon Tomlinson MD  DATE: December 28, 2018  TIME: 9:54 AM
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
## 2019-01-23 ENCOUNTER — OFFICE VISIT (OUTPATIENT)
Dept: SURGICAL ONCOLOGY | Facility: CLINIC | Age: 49
End: 2019-01-23

## 2019-01-23 VITALS
SYSTOLIC BLOOD PRESSURE: 110 MMHG | HEART RATE: 90 BPM | WEIGHT: 239.6 LBS | RESPIRATION RATE: 14 BRPM | DIASTOLIC BLOOD PRESSURE: 80 MMHG | TEMPERATURE: 98.5 F | HEIGHT: 66 IN | BODY MASS INDEX: 38.51 KG/M2

## 2019-01-23 DIAGNOSIS — D24.2 INTRADUCTAL PAPILLOMA OF BREAST, LEFT: ICD-10-CM

## 2019-01-23 DIAGNOSIS — N60.92 INTRADUCTAL HYPERPLASIA WITHOUT ATYPIA OF LEFT BREAST: Primary | ICD-10-CM

## 2019-01-23 DIAGNOSIS — I80.8 THROMBOPHLEBITIS OF BREAST (MONDOR'S DISEASE): ICD-10-CM

## 2019-01-23 DIAGNOSIS — Z12.31 SCREENING MAMMOGRAM, ENCOUNTER FOR: ICD-10-CM

## 2019-01-23 PROBLEM — R92.8 ABNORMAL FINDING ON BREAST IMAGING: Status: RESOLVED | Noted: 2018-12-18 | Resolved: 2019-01-23

## 2019-01-23 PROBLEM — N64.89 RADIAL SCAR OF BREAST: Status: RESOLVED | Noted: 2018-12-18 | Resolved: 2019-01-23

## 2019-01-23 PROCEDURE — 99024 POSTOP FOLLOW-UP VISIT: CPT | Performed by: SURGERY

## 2019-01-23 RX ORDER — CEPHALEXIN 500 MG/1
500 CAPSULE ORAL EVERY 8 HOURS SCHEDULED
Qty: 15 CAPSULE | Refills: 0 | Status: SHIPPED | OUTPATIENT
Start: 2019-01-23 | End: 2019-01-28

## 2019-01-23 NOTE — PROGRESS NOTES
57 y o  female is here today s/p left breast excisional biopsy on 12/28/18  She reports feeling a moderate amount of tenderness at surgical site  She is wearing a supportive bra  Her left breast incision line is dry,intact and healing well  She has experienced a large amount of medial breast swelling and redness which has been diminishing  She reports feeling a cord-like structure with pulling in the lower inner left breast as well  Physical Exam   Constitutional: She is oriented to person, place, and time  She appears well-developed and well-nourished  Pulmonary/Chest: Left breast exhibits skin change (Well-healing circumareolar incision with swelling and tenderness medial, in the lower inner left breast there is a palpable cord consistent with mondors) and tenderness  There is breast swelling  Neurological: She is alert and oriented to person, place, and time  Psychiatric: She has a normal mood and affect  Data:     12/28/2018 left breast excision reveals usual duct hyperplasia and an intraductal papilloma but no malignancy    Diagnoses and all orders for this visit:    Intraductal hyperplasia without atypia of left breast  -     cephalexin (KEFLEX) 500 mg capsule; Take 1 capsule (500 mg total) by mouth every 8 (eight) hours for 5 days    Screening mammogram, encounter for  -     Mammo screening bilateral w 3d & cad; Future    Intraductal papilloma of breast, left    Thrombophlebitis of breast (Mondor's disease)        Assessment/Plan:   65-year-old female status post left breast excision secondary to duct hyperplasia and atypia as well as radial scar on a needle biopsy  Her final excision shows usual type hyperplasia and an intraductal papilloma  She does have mild residual swelling and tenderness in the medial aspect of the breast   There is no obvious erythema although the skin does feel warm to touch  She has been not feeling quite herself as well   I will therefore prescribe a few days of antibiotics  Regarding the Mondor's,  I asked her to use warm compresses and either aspirin or NSAIDs for two weeks  I will make arrangements for her mammogram due in November  I will see her again following this or sooner should the need arise

## 2019-02-24 NOTE — RESULT ENCOUNTER NOTE
Renal function relatively stable  Vitamin-D slightly low  I will review with the patient at the appointment this week

## 2019-02-27 LAB
25(OH)D3 SERPL-MCNC: 23 NG/ML (ref 30–100)
BUN SERPL-MCNC: 21 MG/DL (ref 5–25)
CALCIUM SERPL-MCNC: 8.7 MG/DL (ref 8.3–10.1)
CHLORIDE SERPL-SCNC: 105 MMOL/L (ref 100–108)
CO2 SERPL-SCNC: 25 MMOL/L (ref 21–32)
CREAT SERPL-MCNC: 1.11 MG/DL (ref 0.6–1.3)
EXTERNAL PTH: 42.2
GLUCOSE SERPL-MCNC: 119 MG/DL
HCT VFR BLD AUTO: 39.6 % (ref 34.8–46.1)
HGB BLD-MCNC: 13.2 G/DL (ref 11.5–15.4)
MAGNESIUM SERPL-MCNC: 2 MG/DL (ref 1.6–2.6)
PHOSPHATE SERPL-MCNC: 4.3 MG/DL (ref 2.7–4.5)
PLATELET # BLD AUTO: 253 THOUSANDS/UL (ref 149–390)
POTASSIUM SERPL-SCNC: 4.2 MMOL/L (ref 3.5–5.3)
PROTEIN/CREAT RATIO (HISTORICAL): 0.12
SODIUM SERPL-SCNC: 137 MMOL/L (ref 136–145)
WBC # BLD AUTO: 4.5 THOUSAND/UL

## 2019-02-28 ENCOUNTER — OFFICE VISIT (OUTPATIENT)
Dept: NEPHROLOGY | Facility: CLINIC | Age: 49
End: 2019-02-28
Payer: COMMERCIAL

## 2019-02-28 VITALS
HEIGHT: 66 IN | DIASTOLIC BLOOD PRESSURE: 70 MMHG | SYSTOLIC BLOOD PRESSURE: 126 MMHG | HEART RATE: 80 BPM | BODY MASS INDEX: 38.41 KG/M2 | WEIGHT: 239 LBS

## 2019-02-28 DIAGNOSIS — E55.9 VITAMIN D DEFICIENCY: ICD-10-CM

## 2019-02-28 DIAGNOSIS — N18.2 CKD (CHRONIC KIDNEY DISEASE), STAGE II: Primary | ICD-10-CM

## 2019-02-28 DIAGNOSIS — N18.30 CKD (CHRONIC KIDNEY DISEASE), STAGE III (HCC): ICD-10-CM

## 2019-02-28 PROCEDURE — 99213 OFFICE O/P EST LOW 20 MIN: CPT | Performed by: INTERNAL MEDICINE
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
## 2019-03-27 ENCOUNTER — OFFICE VISIT (OUTPATIENT)
Dept: SURGICAL ONCOLOGY | Facility: CLINIC | Age: 49
End: 2019-03-27

## 2019-03-27 VITALS
WEIGHT: 240 LBS | BODY MASS INDEX: 38.57 KG/M2 | RESPIRATION RATE: 14 BRPM | HEIGHT: 66 IN | HEART RATE: 90 BPM | SYSTOLIC BLOOD PRESSURE: 118 MMHG | TEMPERATURE: 98.6 F | DIASTOLIC BLOOD PRESSURE: 70 MMHG

## 2019-03-27 DIAGNOSIS — N64.89 SEROMA OF BREAST: Primary | ICD-10-CM

## 2019-03-27 PROCEDURE — 99024 POSTOP FOLLOW-UP VISIT: CPT | Performed by: SURGERY

## 2019-03-27 NOTE — PROGRESS NOTES
57 y o  female is here today s/p left breast excision  She reports pain and swelling  Physical Exam   Constitutional: She is oriented to person, place, and time  She appears well-developed and well-nourished  Pulmonary/Chest: Left breast exhibits skin change (Well healed medial circumareolar incision) and tenderness  There is breast swelling  Neurological: She is alert and oriented to person, place, and time  Psychiatric: She has a normal mood and affect  Data:     Ultrasound done today in the office with attention to the medial aspect of the left breast reveals a seroma corresponding to the area of swelling and palpable tenderness, this was aspirated today in the office in 10 cc of serous sanguinous fluid was obtained      Diagnoses and all orders for this visit:    Seroma of breast        Assessment/Plan:  53-year-old female here today secondary to left breast pain and swelling in the site of her recent surgical excision  There was a seroma that was aspirated today in the office  I advised her to use warm compresses and wear a good supportive bra  She may use nonsteroidals or Tylenol for discomfort  I will see her again as scheduled or sooner should the need arise

## 2019-08-05 LAB
CREAT ?TM UR-SCNC: 104 UMOL/L
EXT PROTEIN URINE: 11.9
PROT/CREAT UR: 0.11 MG/G{CREAT}

## 2019-08-16 ENCOUNTER — TELEPHONE (OUTPATIENT)
Dept: NEPHROLOGY | Facility: CLINIC | Age: 49
End: 2019-08-16

## 2019-08-16 NOTE — TELEPHONE ENCOUNTER
I left a message for patient to schedule September follow up with Dr Alex Mcgovern in the [facility]

## 2019-09-20 ENCOUNTER — TELEPHONE (OUTPATIENT)
Dept: NEPHROLOGY | Facility: CLINIC | Age: 49
End: 2019-09-20

## 2019-09-20 DIAGNOSIS — G43.919 INTRACTABLE MIGRAINE: Primary | ICD-10-CM

## 2019-09-20 RX ORDER — DICLOFENAC SODIUM 75 MG/1
75 TABLET, DELAYED RELEASE ORAL 3 TIMES DAILY PRN
Qty: 15 TABLET | Refills: 0 | Status: SHIPPED | OUTPATIENT
Start: 2019-09-20 | End: 2019-11-13

## 2019-09-20 RX ORDER — DIPHENHYDRAMINE HCL 25 MG
25 TABLET ORAL EVERY 8 HOURS PRN
Qty: 15 TABLET | Refills: 0 | Status: SHIPPED | OUTPATIENT
Start: 2019-09-20 | End: 2019-11-13

## 2019-09-20 RX ORDER — PROCHLORPERAZINE MALEATE 10 MG
10 TABLET ORAL EVERY 8 HOURS PRN
Qty: 15 TABLET | Refills: 0 | Status: SHIPPED | OUTPATIENT
Start: 2019-09-20 | End: 2019-11-13

## 2019-09-20 NOTE — TELEPHONE ENCOUNTER
I left a message for patient to schedule November follow up with Dr Marielena Ch in the [facility]

## 2019-09-23 ENCOUNTER — TELEPHONE (OUTPATIENT)
Dept: NEUROLOGY | Facility: CLINIC | Age: 49
End: 2019-09-23

## 2019-09-23 DIAGNOSIS — R51.9 FREQUENT HEADACHES: ICD-10-CM

## 2019-09-23 RX ORDER — BUTALBITAL, ACETAMINOPHEN AND CAFFEINE 50; 325; 40 MG/1; MG/1; MG/1
1 TABLET ORAL DAILY PRN
Qty: 30 TABLET | Refills: 0 | Status: SHIPPED | OUTPATIENT
Start: 2019-09-23 | End: 2019-11-13

## 2019-09-23 RX ORDER — BUTALBITAL, ACETAMINOPHEN AND CAFFEINE 50; 325; 40 MG/1; MG/1; MG/1
1 TABLET ORAL DAILY PRN
Qty: 30 TABLET | Refills: 0 | OUTPATIENT
Start: 2019-09-23

## 2019-09-23 NOTE — TELEPHONE ENCOUNTER
pt called and states that she requested fioricet refill   she said that other medications were sent to pharm  she states that she never took these meds previously  she is requesting fioricet  please advise  [phone] to leave a detailed   she is asking for you to dc the 3 meds that were sent to pharm on Friday

## 2019-10-28 ENCOUNTER — OFFICE VISIT (OUTPATIENT)
Dept: NEUROLOGY | Facility: CLINIC | Age: 49
End: 2019-10-28
Payer: COMMERCIAL

## 2019-10-28 VITALS
BODY MASS INDEX: 38.81 KG/M2 | HEIGHT: 66 IN | SYSTOLIC BLOOD PRESSURE: 108 MMHG | HEART RATE: 84 BPM | WEIGHT: 241.5 LBS | DIASTOLIC BLOOD PRESSURE: 68 MMHG

## 2019-10-28 DIAGNOSIS — M79.7 FIBROMYALGIA: ICD-10-CM

## 2019-10-28 DIAGNOSIS — M79.641 RIGHT HAND PAIN: ICD-10-CM

## 2019-10-28 DIAGNOSIS — G43.719 INTRACTABLE CHRONIC MIGRAINE WITHOUT AURA AND WITHOUT STATUS MIGRAINOSUS: ICD-10-CM

## 2019-10-28 DIAGNOSIS — M79.642 LEFT HAND PAIN: Primary | ICD-10-CM

## 2019-10-28 DIAGNOSIS — Z86.69 HISTORY OF MIGRAINE: ICD-10-CM

## 2019-10-28 DIAGNOSIS — R51.9 PERSISTENT HEADACHES: ICD-10-CM

## 2019-10-28 PROCEDURE — 99215 OFFICE O/P EST HI 40 MIN: CPT | Performed by: PSYCHIATRY & NEUROLOGY

## 2019-10-28 RX ORDER — TOPIRAMATE 50 MG/1
TABLET, FILM COATED ORAL
Qty: 60 TABLET | Refills: 3 | Status: SHIPPED | OUTPATIENT
Start: 2019-10-28
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
## 2019-11-12 ENCOUNTER — TELEPHONE (OUTPATIENT)
Dept: OTHER | Facility: OTHER | Age: 49
End: 2019-11-12

## 2019-11-13 ENCOUNTER — OFFICE VISIT (OUTPATIENT)
Dept: NEPHROLOGY | Facility: CLINIC | Age: 49
End: 2019-11-13
Payer: COMMERCIAL

## 2019-11-13 VITALS
HEIGHT: 66 IN | WEIGHT: 233 LBS | SYSTOLIC BLOOD PRESSURE: 138 MMHG | DIASTOLIC BLOOD PRESSURE: 86 MMHG | BODY MASS INDEX: 37.45 KG/M2

## 2019-11-13 DIAGNOSIS — R94.4 DECREASED GFR: Primary | ICD-10-CM

## 2019-11-13 PROCEDURE — 99214 OFFICE O/P EST MOD 30 MIN: CPT | Performed by: INTERNAL MEDICINE

## 2019-11-13 RX ORDER — ERGOCALCIFEROL 1.25 MG/1
50000 CAPSULE ORAL WEEKLY
COMMUNITY
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
## 2019-11-13 NOTE — PATIENT INSTRUCTIONS
1) Avoid NSAIDS - (Example - motrin, advil, ibuprofen, aleve, exederin, etc)  2) Always follow a low salt diet  3) Labwork in 4-5 months  4) Appointment in 5 months  5) If your GI symptoms do not improve, please call your PCP  6) If your balance issues continues please call your PCP
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
## 2019-12-02 ENCOUNTER — TELEPHONE (OUTPATIENT)
Dept: OBGYN CLINIC | Facility: HOSPITAL | Age: 49
End: 2019-12-02

## 2019-12-02 NOTE — TELEPHONE ENCOUNTER
Returned patients vm to schedule NP apt with Dr Jj Quiroz      Contacted patients and NP apt was scheduled for Jan 2 at [facility] per patients request for date and time

## 2019-12-03 ENCOUNTER — HOSPITAL ENCOUNTER (OUTPATIENT)
Dept: MAMMOGRAPHY | Facility: CLINIC | Age: 49
Discharge: HOME | End: 2019-12-03
Payer: COMMERCIAL

## 2019-12-03 VITALS — BODY MASS INDEX: 36.16 KG/M2 | WEIGHT: 225 LBS | HEIGHT: 66 IN

## 2019-12-03 DIAGNOSIS — Z12.31 ENCOUNTER FOR SCREENING MAMMOGRAM FOR MALIGNANT NEOPLASM OF BREAST: ICD-10-CM

## 2019-12-03 DIAGNOSIS — Z12.31 SCREENING MAMMOGRAM, ENCOUNTER FOR: ICD-10-CM

## 2019-12-03 PROCEDURE — 77067 SCR MAMMO BI INCL CAD: CPT

## 2019-12-03 PROCEDURE — 77063 BREAST TOMOSYNTHESIS BI: CPT

## 2019-12-16 ENCOUNTER — OFFICE VISIT (OUTPATIENT)
Dept: SURGICAL ONCOLOGY | Facility: CLINIC | Age: 49
End: 2019-12-16
Payer: COMMERCIAL

## 2019-12-16 VITALS
TEMPERATURE: 98.9 F | DIASTOLIC BLOOD PRESSURE: 80 MMHG | BODY MASS INDEX: 37.28 KG/M2 | SYSTOLIC BLOOD PRESSURE: 110 MMHG | WEIGHT: 232 LBS | HEIGHT: 66 IN | RESPIRATION RATE: 16 BRPM | HEART RATE: 77 BPM

## 2019-12-16 DIAGNOSIS — Z12.31 SCREENING MAMMOGRAM, ENCOUNTER FOR: ICD-10-CM

## 2019-12-16 DIAGNOSIS — N60.92 INTRADUCTAL HYPERPLASIA WITHOUT ATYPIA OF LEFT BREAST: Primary | ICD-10-CM

## 2019-12-16 PROBLEM — I80.8 THROMBOPHLEBITIS OF BREAST (MONDOR'S DISEASE): Status: RESOLVED | Noted: 2019-01-23 | Resolved: 2019-12-16

## 2019-12-16 PROBLEM — N64.89 SEROMA OF BREAST: Status: RESOLVED | Noted: 2019-03-27 | Resolved: 2019-12-16

## 2019-12-16 PROCEDURE — 99213 OFFICE O/P EST LOW 20 MIN: CPT | Performed by: SURGERY

## 2019-12-16 RX ORDER — KETOCONAZOLE 20 MG/ML
SHAMPOO TOPICAL
COMMUNITY
Start: 2019-11-22
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
## 2020-01-02 ENCOUNTER — APPOINTMENT (OUTPATIENT)
Dept: RADIOLOGY | Facility: CLINIC | Age: 50
End: 2020-01-02
Payer: COMMERCIAL

## 2020-01-02 ENCOUNTER — TELEPHONE (OUTPATIENT)
Dept: OBGYN CLINIC | Facility: HOSPITAL | Age: 50
End: 2020-01-02

## 2020-01-02 ENCOUNTER — OFFICE VISIT (OUTPATIENT)
Dept: OBGYN CLINIC | Facility: CLINIC | Age: 50
End: 2020-01-02
Payer: COMMERCIAL

## 2020-01-02 VITALS
SYSTOLIC BLOOD PRESSURE: 106 MMHG | DIASTOLIC BLOOD PRESSURE: 73 MMHG | HEIGHT: 66 IN | BODY MASS INDEX: 36 KG/M2 | WEIGHT: 224 LBS | HEART RATE: 105 BPM

## 2020-01-02 DIAGNOSIS — M18.12 ARTHRITIS OF CARPOMETACARPAL (CMC) JOINT OF LEFT THUMB: Primary | ICD-10-CM

## 2020-01-02 DIAGNOSIS — M25.532 PAIN IN LEFT WRIST: ICD-10-CM

## 2020-01-02 DIAGNOSIS — M25.531 PAIN IN RIGHT WRIST: ICD-10-CM

## 2020-01-02 DIAGNOSIS — M65.4 DE QUERVAIN'S TENOSYNOVITIS, RIGHT: ICD-10-CM

## 2020-01-02 PROCEDURE — 20550 NJX 1 TENDON SHEATH/LIGAMENT: CPT | Performed by: ORTHOPAEDIC SURGERY

## 2020-01-02 PROCEDURE — 73110 X-RAY EXAM OF WRIST: CPT

## 2020-01-02 PROCEDURE — 99203 OFFICE O/P NEW LOW 30 MIN: CPT | Performed by: ORTHOPAEDIC SURGERY

## 2020-01-02 PROCEDURE — 20600 DRAIN/INJ JOINT/BURSA W/O US: CPT | Performed by: ORTHOPAEDIC SURGERY

## 2020-01-02 RX ORDER — LIDOCAINE HYDROCHLORIDE 5 MG/ML
0.5 INJECTION, SOLUTION INFILTRATION; PERINEURAL
Status: COMPLETED | OUTPATIENT
Start: 2020-01-02 | End: 2020-01-02

## 2020-01-02 RX ORDER — TRIAMCINOLONE ACETONIDE 40 MG/ML
20 INJECTION, SUSPENSION INTRA-ARTICULAR; INTRAMUSCULAR
Status: COMPLETED | OUTPATIENT
Start: 2020-01-02 | End: 2020-01-02

## 2020-01-02 RX ADMIN — TRIAMCINOLONE ACETONIDE 20 MG: 40 INJECTION, SUSPENSION INTRA-ARTICULAR; INTRAMUSCULAR at 11:09

## 2020-01-02 RX ADMIN — TRIAMCINOLONE ACETONIDE 20 MG: 40 INJECTION, SUSPENSION INTRA-ARTICULAR; INTRAMUSCULAR at 11:08

## 2020-01-02 RX ADMIN — LIDOCAINE HYDROCHLORIDE 0.5 ML: 5 INJECTION, SOLUTION INFILTRATION; PERINEURAL at 11:09

## 2020-01-02 RX ADMIN — LIDOCAINE HYDROCHLORIDE 0.5 ML: 5 INJECTION, SOLUTION INFILTRATION; PERINEURAL at 11:08

## 2020-01-02 NOTE — TELEPHONE ENCOUNTER
Patient called in stating she is in more pain in her hands than she was before  She stated her  strength is decreased and she can barely grab a hold of door handles  She stated she had a metallic taste in her mouth about an hour after getting the injection and she felt so tired that she had to take a nap  Since her nap she woke up with the decreased strength in her hands and swelling in the hands  Advised her she may have some inflammation around the nerve where injections were done causing the decreased strength  She stated she has fibromyalgia as well so this could be a flare up of that or caused by her medication she is taking for fibro  Advised her to ice the area of the injections and rest  She cannot take NSAIDs  Please advise

## 2020-01-02 NOTE — TELEPHONE ENCOUNTER
She can ice her wrist  But yes please re iterate that pain can get worse initially after shot  de guo

## 2020-01-02 NOTE — TELEPHONE ENCOUNTER
Did advise the worsening of pain before the injection takes effect  She verbalized understanding, will call back in a few days  If worsening symptoms occur, patient has been instructed to proceed to ER if anything alarming comes up
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
## 2020-01-04 ENCOUNTER — OFFICE VISIT (OUTPATIENT)
Dept: URGENT CARE | Age: 50
End: 2020-01-04
Payer: COMMERCIAL

## 2020-01-04 VITALS
SYSTOLIC BLOOD PRESSURE: 140 MMHG | HEIGHT: 66 IN | RESPIRATION RATE: 20 BRPM | WEIGHT: 224 LBS | OXYGEN SATURATION: 96 % | HEART RATE: 79 BPM | DIASTOLIC BLOOD PRESSURE: 68 MMHG | BODY MASS INDEX: 36 KG/M2 | TEMPERATURE: 97.5 F

## 2020-01-04 DIAGNOSIS — T50.905A MEDICATION REACTION, INITIAL ENCOUNTER: Primary | ICD-10-CM

## 2020-01-04 PROCEDURE — G0382 LEV 3 HOSP TYPE B ED VISIT: HCPCS | Performed by: PHYSICIAN ASSISTANT

## 2020-01-04 PROCEDURE — S9083 URGENT CARE CENTER GLOBAL: HCPCS | Performed by: PHYSICIAN ASSISTANT
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
## 2020-01-04 NOTE — PATIENT INSTRUCTIONS
Medications administered: 0 5 mL lidocaine 0 5 %; 20 mg triamcinolone acetonide 40 mg/mL    -recommend going to ER due to your severity of pain and symptoms  -it is common after steroid injections to have increased pain for up to a week  It appears you are having a steroid flare  This should go away in a few days  Follow up with orthopedics Monday  May try some benadryl for reaction    OTC tylenol as needed and ice    -Please go to ER if any symptoms worsen

## 2020-01-21 ENCOUNTER — TELEPHONE (OUTPATIENT)
Dept: NEPHROLOGY | Facility: CLINIC | Age: 50
End: 2020-01-21

## 2020-01-21 NOTE — TELEPHONE ENCOUNTER
I left a message for patient to call to schedule March follow up with Dr Che Tran in our [facility]

## 2020-01-22 ENCOUNTER — TELEPHONE (OUTPATIENT)
Dept: NEUROLOGY | Facility: CLINIC | Age: 50
End: 2020-01-22

## 2020-01-22 DIAGNOSIS — M79.642 HAND PAIN, LEFT: Primary | ICD-10-CM

## 2020-01-22 NOTE — TELEPHONE ENCOUNTER
Patient was referred to Dr Antoine Mckeon- had 2 injections recently done in wrists- She ended up in Urgent Care-     Urgent Care stated she has "steroid flare"- reaction to steroids  Patient states she felt medication go through her veins and through her head  She feels the medication going through her body still  She does not have full use of her left thumb- very limited mobility  Her right thumb she can move a little, it does hurt to move  Left thumb she can not move by itself-has to physically move  Patient still complains of hand tightness, she can not lift left thumb, numbness  She still experiences burning and tingling in her pointer finger  She notes pain travels from between thumb and pointer up to inside of elbow  She noted diarrhea after injection but it sense has subsided  Patient requesting referral for Hand Therapy- She currently does water therapy and she spoke with them and they said they would work with her  Patient is UNCOMFORTABLE  And prefers NOT to follow up with Dr Antoine Mckeon  Please see encounters from Dr Suzan Holliday office dated 1/2/2020  Once referral is entered, I will print and call patient

## 2020-01-22 NOTE — TELEPHONE ENCOUNTER
That reaction is very unusual  People usually do very well with him  If it's a steroid reaction, then it couldn't have been predicted  We can place order for OT

## 2020-02-21 ENCOUNTER — HOSPITAL ENCOUNTER (OUTPATIENT)
Dept: NEUROLOGY | Facility: HOSPITAL | Age: 50
Discharge: HOME | End: 2020-02-21
Attending: PSYCHIATRY & NEUROLOGY
Payer: COMMERCIAL

## 2020-02-21 DIAGNOSIS — M79.642 LEFT HAND PAIN: ICD-10-CM

## 2020-02-21 DIAGNOSIS — M79.641 RIGHT HAND PAIN: ICD-10-CM

## 2020-02-21 PROCEDURE — 95911 NRV CNDJ TEST 9-10 STUDIES: CPT | Performed by: PSYCHIATRY & NEUROLOGY

## 2020-02-21 PROCEDURE — 95886 MUSC TEST DONE W/N TEST COMP: CPT | Performed by: PSYCHIATRY & NEUROLOGY

## 2020-02-27 ENCOUNTER — TELEPHONE (OUTPATIENT)
Dept: OBGYN CLINIC | Facility: HOSPITAL | Age: 50
End: 2020-02-27

## 2020-04-21 ENCOUNTER — TELEPHONE (OUTPATIENT)
Dept: OBGYN CLINIC | Facility: CLINIC | Age: 50
End: 2020-04-21

## 2020-04-21 DIAGNOSIS — B37.49 CANDIDA INFECTION OF GENITAL REGION: Primary | ICD-10-CM

## 2020-06-01 ENCOUNTER — TELEPHONE (OUTPATIENT)
Dept: NEUROLOGY | Facility: CLINIC | Age: 50
End: 2020-06-01

## 2020-07-23 ENCOUNTER — TELEPHONE (OUTPATIENT)
Dept: NEPHROLOGY | Facility: CLINIC | Age: 50
End: 2020-07-23

## 2020-07-23 DIAGNOSIS — N18.30 CKD (CHRONIC KIDNEY DISEASE), STAGE III (HCC): Primary | ICD-10-CM

## 2020-07-23 NOTE — TELEPHONE ENCOUNTER
Hello    There is chance for systemic absorption  And it is an nsaid cream      So, would not recommend long term use   If she is to use it, please ask pt to get BMP 1-2 weeks after using so we can see if kidneys were affected    Thank you    np

## 2020-07-23 NOTE — TELEPHONE ENCOUNTER
Pt is asking if she can use Pennsaid cream for fibromylagia aches and pain? This was prescribed to her but she was told to check to make sure its ok to use

## 2020-08-05 LAB
CREAT ?TM UR-SCNC: 162 UMOL/L
CREAT ?TM UR-SCNC: 163 UMOL/L
EXT MICROALBUMIN URINE RANDOM: 1
EXT PROTEIN URINE: 12
HBA1C MFR BLD HPLC: 6.1 %
MICROALBUMIN/CREAT UR: 6.1 MG/G{CREAT}
PROT/CREAT UR: 0.07 MG/G{CREAT}

## 2020-08-07 NOTE — RESULT ENCOUNTER NOTE
Hello    Patient normally is followed up by Ms Lindsay Funes  Was there any other blood work that came with this  There should have been a BMP also      Thank you    np

## 2020-08-10 ENCOUNTER — DOCUMENTATION (OUTPATIENT)
Dept: NEPHROLOGY | Facility: CLINIC | Age: 50
End: 2020-08-10

## 2020-08-10 ENCOUNTER — TELEPHONE (OUTPATIENT)
Dept: NEPHROLOGY | Facility: CLINIC | Age: 50
End: 2020-08-10

## 2020-08-10 LAB
CHOLEST SERPL-MCNC: 197 MG/DL (ref 50–200)
CHOLEST/HDLC SERPL: 3.08 {RATIO}
CREAT ?TM UR-SCNC: 162 UMOL/L
CREAT ?TM UR-SCNC: 163 UMOL/L
EXT BLOOD, UA: NEGATIVE
EXT GLUCOSE BLD: 113
EXT GLUCOSE, UA: 500
EXT KETONES: NEGATIVE
EXT MICROALBUMIN URINE RANDOM: 1
EXT NITRITE, UA: NEGATIVE
EXT PH, UA: 6
EXT PROTEIN URINE: 12
EXT PROTEIN, UA: NEGATIVE
EXT SPECIFIC GRAVITY, UA: 1.02
EXTERNAL ALBUMIN: 3.9
EXTERNAL ALK PHOS: 54
EXTERNAL ALT: 19
EXTERNAL AST: 13
EXTERNAL BUN: 17
EXTERNAL CALCIUM: 8.9
EXTERNAL CHLORIDE: 105
EXTERNAL CO2: 24
EXTERNAL CREATININE: 1.17
EXTERNAL EGFR: 56
EXTERNAL MAGNESIUM: 2.3
EXTERNAL PHOSPHORUS: 3.9
EXTERNAL POTASSIUM: 4.4
EXTERNAL PTH: 14.4
EXTERNAL SODIUM: 138
EXTERNAL T.BILIRUBIN: 0.4
EXTERNAL THYROID STIMULATING HORMONE: 1.27 UIU/ML
EXTERNAL TOTAL PROTEIN: 8.1
EXTERNAL WBC (UA): 0
HBA1C MFR BLD HPLC: 6.1 %
HDLC SERPL-MCNC: 64 MG/DL (ref 40–?)
LDLC SERPL CALC-MCNC: 115 MG/DL (ref 0–100)
MICROALBUMIN/CREAT UR: 6.1 MG/G{CREAT}
NONHDLC SERPL-MCNC: 133 MG/DL
PROT/CREAT UR: 0.07 MG/G{CREAT}
TRIGL SERPL-MCNC: 89 MG/DL (ref ?–150)
WBC # BLD EST: NEGATIVE 10*3/UL

## 2020-08-10 NOTE — TELEPHONE ENCOUNTER
----- Message from Enoc Salas MD sent at 8/7/2020  4:58 PM EDT -----  Hello    Patient normally is followed up by Ms Isreal Tucker  Was there any other blood work that came with this  There should have been a BMP also      Thank you    np

## 2020-08-10 NOTE — TELEPHONE ENCOUNTER
Hello    Please let pt know that Cr stable  Electrolytes stable  Mag 2 3 can monitor  - vit D 68    - would reduce vitamin D supplement to 1,000 units once daily   She is on 5000 units daily    Thank you    np

## 2020-08-11 NOTE — TELEPHONE ENCOUNTER
Lm for the patient advising labs are stable at this time      -advised to decrease her vitamin d to 1000 units daily, any questions she can call the office

## 2020-11-19 ENCOUNTER — TELEPHONE (OUTPATIENT)
Dept: SURGICAL ONCOLOGY | Facility: CLINIC | Age: 50
End: 2020-11-19

## 2020-11-20 DIAGNOSIS — Z12.31 ENCOUNTER FOR SCREENING MAMMOGRAM FOR BREAST CANCER: Primary | ICD-10-CM

## 2021-01-28 DIAGNOSIS — B37.49 CANDIDA INFECTION OF GENITAL REGION: ICD-10-CM

## 2021-05-26 ENCOUNTER — TRANSCRIBE ORDERS (OUTPATIENT)
Dept: ADMINISTRATIVE | Facility: HOSPITAL | Age: 51
End: 2021-05-26

## 2021-05-26 DIAGNOSIS — M25.50 PAIN IN UNSPECIFIED JOINT: ICD-10-CM

## 2021-05-26 DIAGNOSIS — M15.0 PRIMARY GENERALIZED (OSTEO)ARTHRITIS: Primary | ICD-10-CM

## 2021-07-19 ENCOUNTER — TELEPHONE (OUTPATIENT)
Dept: SURGICAL ONCOLOGY | Facility: CLINIC | Age: 51
End: 2021-07-19

## 2021-07-19 NOTE — TELEPHONE ENCOUNTER
Called patient and left detailed message in regards to rescheduling her overdue mammogram and a follow up appointment with Dr Debby He   Requested she call office so we may assist her in doing so   ----- Message from Eulalio Low RN sent at 7/14/2021  3:32 PM EDT -----  Pt cancelled her last office visit and mammo, she is now overdue for both     ----- Message -----  From: SYSTEM  Sent: 7/14/2021  12:25 AM EDT  To: Surgical Oncology Clinical

## 2021-09-15 PROBLEM — L30.9 DERMATITIS: Status: ACTIVE | Noted: 2021-09-15

## 2021-09-15 PROBLEM — K74.3 PRIMARY BILIARY CIRRHOSIS (HCC): Status: ACTIVE | Noted: 2021-09-15

## 2021-09-15 PROBLEM — M47.816 LUMBAR SPONDYLOSIS: Status: ACTIVE | Noted: 2021-09-15

## 2021-09-15 PROBLEM — M06.4 INFLAMMATORY POLYARTHRITIS (HCC): Status: ACTIVE | Noted: 2021-09-15

## 2021-09-15 PROBLEM — M79.7 FIBROMYALGIA: Status: ACTIVE | Noted: 2021-09-15

## 2021-09-15 PROBLEM — N18.9 CKD (CHRONIC KIDNEY DISEASE): Status: RESOLVED | Noted: 2021-09-15 | Resolved: 2021-09-15

## 2021-09-15 PROBLEM — N18.9 CKD (CHRONIC KIDNEY DISEASE): Status: ACTIVE | Noted: 2021-09-15

## 2021-09-15 PROBLEM — M47.812 CERVICAL SPONDYLOSIS: Status: ACTIVE | Noted: 2021-09-15

## 2021-09-15 PROBLEM — G43.909 MIGRAINE: Status: ACTIVE | Noted: 2021-09-15

## 2022-07-13 PROBLEM — M06.09 SERONEGATIVE ARTHROPATHY OF MULTIPLE SITES (HCC): Status: ACTIVE | Noted: 2022-07-13

## 2022-07-13 PROBLEM — M22.41 CHONDROMALACIA OF BOTH PATELLAE: Status: ACTIVE | Noted: 2022-07-13

## 2022-07-13 PROBLEM — M22.42 CHONDROMALACIA OF BOTH PATELLAE: Status: ACTIVE | Noted: 2022-07-13

## 2022-07-13 PROBLEM — G62.9 SMALL FIBER NEUROPATHY: Status: ACTIVE | Noted: 2022-07-13

## 2022-08-11 ENCOUNTER — TELEPHONE (OUTPATIENT)
Dept: NEUROLOGY | Facility: CLINIC | Age: 52
End: 2022-08-11

## 2022-12-19 PROBLEM — G56.23 CUBITAL TUNNEL SYNDROME, BILATERAL: Status: ACTIVE | Noted: 2022-12-19

## 2022-12-19 PROBLEM — R70.0 ELEVATED SED RATE: Status: ACTIVE | Noted: 2022-12-19

## 2022-12-19 PROBLEM — R79.82 ELEVATED C-REACTIVE PROTEIN (CRP): Status: ACTIVE | Noted: 2022-12-19

## 2024-01-31 ENCOUNTER — OFFICE VISIT (OUTPATIENT)
Age: 54
End: 2024-01-31

## 2024-01-31 VITALS
WEIGHT: 229.6 LBS | TEMPERATURE: 98.2 F | HEIGHT: 66 IN | BODY MASS INDEX: 36.9 KG/M2 | HEART RATE: 81 BPM | OXYGEN SATURATION: 98 % | DIASTOLIC BLOOD PRESSURE: 84 MMHG | SYSTOLIC BLOOD PRESSURE: 132 MMHG

## 2024-01-31 DIAGNOSIS — M79.7 FIBROMYALGIA: ICD-10-CM

## 2024-01-31 DIAGNOSIS — M70.62 TROCHANTERIC BURSITIS OF LEFT HIP: ICD-10-CM

## 2024-01-31 DIAGNOSIS — R76.8 POSITIVE ANA (ANTINUCLEAR ANTIBODY): ICD-10-CM

## 2024-01-31 DIAGNOSIS — E55.9 VITAMIN D DEFICIENCY: ICD-10-CM

## 2024-01-31 DIAGNOSIS — M06.09 SERONEGATIVE ARTHROPATHY OF MULTIPLE SITES (HCC): Primary | ICD-10-CM

## 2024-01-31 DIAGNOSIS — Z79.899 ENCOUNTER FOR LONG-TERM (CURRENT) USE OF OTHER MEDICATIONS: ICD-10-CM

## 2024-01-31 NOTE — PATIENT INSTRUCTIONS
- MAGNESIUM MALATE 300 MG DAILY  - XYLIMELTS, PUR GUM    Hip Bursitis Exercises   AMBULATORY CARE:   Hip bursitis exercises  help strengthen the muscles in your hip and keep the joint flexible. Strong muscles can help reduce pain, prevent injury, and keep the joint stable. The exercises can also help increase the range of motion in your hip joint.       Call your doctor or physical therapist if:   You have sharp pain during exercise or at rest.    You have questions or concerns about the stretches or exercises.    What you need to know about exercise safety:   Move slowly and smoothly.  Avoid fast or jerky motions. This will help prevent an injury.    Breathe normally.  Do not hold your breath. It is important to breathe in and out so you do not tense up during exercise. Tension could prevent you from moving your joint in a full range of motion.    Do the exercises and stretches on both legs.  Do this so both hips remain strong and flexible.    Stop if you feel sharp pain or an increase in pain.  Contact your healthcare provider or physical therapist. It is normal to feel some discomfort during exercise. Regular exercise will help decrease your discomfort over time.    Warm up and cool down when you exercise.  Walk or ride a stationary bike for 5 to 10 minutes before you start. This warms and relaxes your muscles to help prevent an injury. When you have finished the exercises, cool down by walking in place for a few minutes. You may also need to stretch as part of your warm up or cool down routine. Your provider or physical therapist will tell you which stretches to do.       How to do hip stretches:  Your healthcare provider or physical therapist will tell you how many times to do each stretch. Do the stretch on both sides before you move to the next stretch.  Standing iliotibial band stretch:  Stand with the leg on your injured side behind your other leg. Bend sideways toward the side that is not injured. Stop  when you feel a stretch in your outer hip. Hold for 5 to 10 seconds. Then return to the starting position.         Lying iliotibial band stretch:  Lie on your back. Bend the knee on your injured side toward your chest. Place your hands on the outside of your knee and thigh. Slowly pull the knee across your body. Stop when you feel a stretch in your hip and outer thigh. Hold for 5 to 10 seconds. Return your leg to the starting position.         Hip stretch:  Lie on your back with both legs straight and on the ground. Bend the knee on your injured side toward your chest until you can reach your lower leg. Place both hands on your shin and pull your knee toward your chest. Hold for 5 to 10 seconds. Return your leg to the starting position.         Knee to chest:  Lie on your back with both knees bent and feet flat on the floor. Bend the knee on your injured side toward your chest until you can reach your lower leg. Place both hands on your shin and pull your knee toward your chest. Hold for 5 to 10 seconds. Return your leg to the starting position.         Internal hip rotator stretch:  You will do this exercise on a table. Lie on your side with the injured hip on top. You may be told to keep a pillow between your thighs. Move the top leg so the foot hangs below the edge of the table. Rotate your hip to raise your foot in the opposite direction of the bottom shoulder. Raise your foot as high as you can so you feel a stretch in the back of your thigh. Hold for 5 seconds. Then slowly lower your foot to the starting position.    External hip rotator stretch:  You will do this exercise on a table. Lie on your side with the injured hip on the bottom. You do not need a pillow between your thighs for this exercise. Move the bottom leg so the foot is off the edge of the table. Rotate your hip to lift the foot in the opposite direction of the bottom shoulder. Raise your foot as high as you can so you feel a stretch in your  buttock. Hold for 5 seconds. Then slowly lower your foot to the starting position.    Kneeling hip flexor stretch:  Kneel on your knee on the injured side. Place the foot of your other leg on the floor so the knee is bent. Put both hands on top of your thigh. Keep your back straight and abdominal muscles tight. Lean forward until you feel a stretch in your other thigh. Hold the stretch for 10 seconds. Return to the starting position.       How to do hip strengthening exercises:  Your healthcare provider or physical therapist will tell you how many times to do each exercise. Do the exercise on both sides before you move to the next exercise.  Straight leg lift to the side:  This may also be called hip abduction. Lie on your side with straight legs, with the injured hip on top. Slowly raise your top leg toward the ceiling as high as you can. Keep your foot pointed. Hold for 5 seconds. Then slowly lower your leg to the starting position.         Inner thigh lift:  This may also be called hip adduction. Lie on your side with straight legs, with the injured hip on the bottom. Cross your top leg over your bottom leg. Put the foot of your top leg on the floor in front of you. Raise your bottom leg until it touches the top leg. Hold for 5 seconds. Then slowly lower the leg to the floor.         Clam exercise:  Lie on your side so your injured side is on top. Bend your knees. Keep your heels together during this exercise. Slowly raise your top knee toward the ceiling. Then lower your leg so your knees are together.       Follow up with your doctor or physical therapist as directed:  Write down your questions so you remember to ask them during your visits.  © Copyright [employer] 2023 Information is for End User's use only and may not be sold, redistributed or otherwise used for commercial purposes.  The above information is an  only. It is not intended as medical advice for individual conditions or treatments.  Talk to your doctor, nurse or pharmacist before following any medical regimen to see if it is safe and effective for you.

## 2024-01-31 NOTE — ASSESSMENT & PLAN NOTE
History of positive ZENA.  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  She does have some dry eye and dry mouth symptoms however Sjogren's antibodies have been negative in the past.  She did have evidence of thyroid nodules on imaging studies ordered by another physician however follow-up lab work revealed normal thyroid function.  Recommend checking thyroid antibodies as well.  We did discuss symptomatic treatment for dry eye and dry mouth symptoms.  We will continue to monitor closely.

## 2024-01-31 NOTE — ASSESSMENT & PLAN NOTE
History of seronegative inflammatory arthritis which was treated with Plaquenil.  Unfortunately she developed GI side effects and discontinued this.  She does not have any signs of active inflammatory arthritis at this time.  No signs of active inflammation or synovitis on exam today.  We will continue to monitor her symptoms off treatment.  If she would have a flare would consider reinitiating treatment with a different DMARD.  Labs as ordered to monitor for disease activity.  Plan to follow-up in 4 to 6 months or sooner if needed.

## 2024-01-31 NOTE — PROGRESS NOTES
Assessment/Plan:    Seronegative arthropathy of multiple sites (HCC)  History of seronegative inflammatory arthritis which was treated with Plaquenil.  Unfortunately she developed GI side effects and discontinued this.  She does not have any signs of active inflammatory arthritis at this time.  No signs of active inflammation or synovitis on exam today.  We will continue to monitor her symptoms off treatment.  If she would have a flare would consider reinitiating treatment with a different DMARD.  Labs as ordered to monitor for disease activity.  Plan to follow-up in 4 to 6 months or sooner if needed.    Fibromyalgia  Chronic myofascial pain is generally stable.  She does continue with aquatic therapy which is helpful for her symptoms.  Encouraged continued home exercise program.  We also discussed adding a magnesium malate supplement which can be helpful for myofascial symptoms.    Trochanteric bursitis of left hip  Persistent pain consistent with trochanteric bursitis in her left hip.  We did discuss multiple treatment options.  At this time she would like to start with a home exercise program and I have given her exercises to try at home.  If she does not get any relief from this we could consider a course of formal physical therapy or an injection.    Positive ZENA (antinuclear antibody)  History of positive ZENA.  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  She does have some dry eye and dry mouth symptoms however Sjogren's antibodies have been negative in the past.  She did have evidence of thyroid nodules on imaging studies ordered by another physician however follow-up lab work revealed normal thyroid function.  Recommend checking thyroid antibodies as well.  We did discuss symptomatic treatment for dry eye and dry mouth symptoms.  We will continue to monitor closely.       Diagnoses and all orders for this visit:    Seronegative arthropathy of multiple sites (HCC)  -     CBC and  differential; Future  -     Comprehensive metabolic panel; Future  -     Sedimentation rate, automated; Future  -     C-reactive protein; Future  -     UA (URINE) with reflex to Scope    Encounter for long-term (current) use of other medications  -     CBC and differential; Future  -     Comprehensive metabolic panel; Future  -     Sedimentation rate, automated; Future  -     C-reactive protein; Future  -     UA (URINE) with reflex to Scope    Vitamin D deficiency  -     Vitamin D 25 hydroxy; Future    Positive ZENA (antinuclear antibody)  -     Thyroid Peroxidase and Thyroglobulin Antibodies; Future    Fibromyalgia    Trochanteric bursitis of left hip        Spent 30 minutes total reviewing labs, chart notes, imaging studies, performing history and physical exam, discussing medication and treatment, counseling patient, and completing chart note.          Subjective:      Patient ID: Cyn Ramirez is a 53 y.o. female.    She is here for follow-up of her seronegative inflammatory arthritis.  She was initially started on Plaquenil in 2021 however she discontinued this due to GI issues.  Her inflammatory arthritis symptoms have generally been quiescent.  She did have some discomfort in her left index finger this week however overall the symptoms have been stable.  She has minimal stiffness in the morning.  She has no swelling in her joints.  She has a history of fibromyalgia.  She has tried multiple medications including Cymbalta, gabapentin, and Lyrica however she was intolerant to these.  Her myofascial symptoms are generally stable.  She is currently doing aquatic therapy which she finds to be helpful.  She is also working on making dietary changes.  She is also taking a turmeric supplement which she finds to be beneficial for her joint pain.      She has a history of osteoarthritis along with lumbar spondylosis.  She has been experiencing left hip pain.  She had an x-ray done of her hips on 11/29/2023.  This  was unremarkable.  Her pain is localized to her left trochanteric bursa area and she is very tender over this spot.    She has a history of neuropathic symptoms.  She underwent a left carpal tunnel and cubital tunnel release in March 2023.     She is following with GI at [facility] gastroenterology for narrowing of the common bile duct which was found on an MRI from November 2023.  She has a follow up tomorrow to review the images of her MRI.  She does have a history of primary biliary cirrhosis.  She has a history of a positive ZENA in the past as well.  She does have dry eye and dry mouth symptoms however Sjogren's antibodies have been negative in the past.    She had labs done on 12/28/2023.  Creatinine 1.07, GFR 63.  CBC unremarkable.  ESR 36, CRP 18.4.  Urinalysis unremarkable.        The following portions of the patient's history were reviewed and updated as appropriate: allergies, current medications, past family history, past medical history, past social history, past surgical history, and problem list.    Review of Systems   Constitutional:  Positive for fatigue. Negative for chills and fever.   HENT:  Negative for hearing loss, sore throat and tinnitus.         Dry mouth   Eyes:  Negative for pain and visual disturbance.        Dry eyes   Respiratory:  Negative for cough and shortness of breath.    Cardiovascular:  Negative for chest pain and palpitations.   Gastrointestinal:  Negative for abdominal pain, nausea and vomiting.   Genitourinary:  Negative for difficulty urinating.   Musculoskeletal:  Positive for arthralgias and myalgias. Negative for back pain, gait problem, joint swelling, neck pain and neck stiffness.   Skin:  Negative for rash.   Neurological:  Negative for dizziness, seizures, weakness, numbness and headaches.   Psychiatric/Behavioral:  Negative for confusion, decreased concentration and sleep disturbance.          Objective:      /84 (BP Location: Right arm, Patient Position: Sitting,  "Cuff Size: Adult)   Pulse 81   Temp 98.2 °F (36.8 °C) (Tympanic)   Ht 5' 6.25\" (1.683 m)   Wt 104 kg (229 lb 9.6 oz)   SpO2 98%   BMI 36.78 kg/m²          Physical Exam  Constitutional:       Appearance: Normal appearance.   Cardiovascular:      Rate and Rhythm: Normal rate and regular rhythm.   Pulmonary:      Breath sounds: Normal breath sounds.   Musculoskeletal:      Comments: Decreased lateral flexion neck.  Triggers posterior cervical and shoulder girdle muscles.  Full range of motion bilateral shoulders, elbows, wrists.  Triggers lateral and medial epicondyles bilaterally.  Mild interphalangeal OA changes, squaring 1st CMC joints bilateral hands.    No synovitis noted.  Full range of motion bilateral hips, knees, ankles.  Tenderness trochanteric and pes anserine bursa bilaterally.  Patellofemoral crepitus noted bilateral knees.   Skin:     General: Skin is warm and dry.   Neurological:      General: No focal deficit present.      Mental Status: She is alert.         Dragon Dictation software was used to dictate this note. It may contain errors with dictating incorrect words/spelling. Please contact provider directly for any questions.    "

## 2024-01-31 NOTE — ASSESSMENT & PLAN NOTE
Persistent pain consistent with trochanteric bursitis in her left hip.  We did discuss multiple treatment options.  At this time she would like to start with a home exercise program and I have given her exercises to try at home.  If she does not get any relief from this we could consider a course of formal physical therapy or an injection.

## 2024-01-31 NOTE — ASSESSMENT & PLAN NOTE
Chronic myofascial pain is generally stable.  She does continue with aquatic therapy which is helpful for her symptoms.  Encouraged continued home exercise program.  We also discussed adding a magnesium malate supplement which can be helpful for myofascial symptoms.

## 2024-03-12 LAB
THYROGLOB AB SERPL-ACNC: <1 IU/ML
THYROPEROXIDASE AB SERPL-ACNC: 1 IU/ML

## 2024-07-09 ENCOUNTER — TELEPHONE (OUTPATIENT)
Age: 54
End: 2024-07-09

## 2024-07-22 ENCOUNTER — OFFICE VISIT (OUTPATIENT)
Age: 54
End: 2024-07-22
Payer: COMMERCIAL

## 2024-07-22 VITALS
OXYGEN SATURATION: 99 % | HEART RATE: 75 BPM | DIASTOLIC BLOOD PRESSURE: 74 MMHG | SYSTOLIC BLOOD PRESSURE: 128 MMHG | TEMPERATURE: 98.3 F | WEIGHT: 213 LBS | BODY MASS INDEX: 34.23 KG/M2 | HEIGHT: 66 IN

## 2024-07-22 DIAGNOSIS — R76.8 POSITIVE ANA (ANTINUCLEAR ANTIBODY): ICD-10-CM

## 2024-07-22 DIAGNOSIS — M15.0 PRIMARY GENERALIZED (OSTEO)ARTHRITIS: ICD-10-CM

## 2024-07-22 DIAGNOSIS — R79.82 ELEVATED C-REACTIVE PROTEIN (CRP): ICD-10-CM

## 2024-07-22 DIAGNOSIS — G62.9 SMALL FIBER NEUROPATHY: ICD-10-CM

## 2024-07-22 DIAGNOSIS — K74.3 PRIMARY BILIARY CIRRHOSIS (HCC): ICD-10-CM

## 2024-07-22 DIAGNOSIS — M06.09 SERONEGATIVE ARTHROPATHY OF MULTIPLE SITES (HCC): Primary | ICD-10-CM

## 2024-07-22 DIAGNOSIS — M22.42 CHONDROMALACIA OF BOTH PATELLAE: ICD-10-CM

## 2024-07-22 DIAGNOSIS — M79.7 FIBROMYALGIA: ICD-10-CM

## 2024-07-22 DIAGNOSIS — M22.41 CHONDROMALACIA OF BOTH PATELLAE: ICD-10-CM

## 2024-07-22 DIAGNOSIS — M70.62 TROCHANTERIC BURSITIS OF LEFT HIP: ICD-10-CM

## 2024-07-22 PROCEDURE — 99214 OFFICE O/P EST MOD 30 MIN: CPT | Performed by: INTERNAL MEDICINE

## 2024-07-22 RX ORDER — LANCETS 33 GAUGE
EACH MISCELLANEOUS
COMMUNITY
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
## 2024-07-22 NOTE — PATIENT INSTRUCTIONS
Get the lab work that you have ordered completed.  I will give you a slip for new lab work for your next visit.  Let us know how your second opinion goes for the breast cancer and what plans they have moving forward.

## 2024-07-24 NOTE — ASSESSMENT & PLAN NOTE
Elevated CRP persists despite no evidence for inflammatory arthritis on exam at this time.  There likely is some contribution from high BMI.  Continue to assess clinically by history and exam as well as with labs as ordered.  Continue to monitor

## 2024-07-24 NOTE — ASSESSMENT & PLAN NOTE
Myofascial symptoms persist with generalized pain and fatigue.  She does note improvement with water exercise although she is only going generally once weekly, but is doing home exercises well.  She is using magnesium malate.  She is working on a healthier diet and is trying to incorporate the anti-inflammatory component into her diet as well.  Continue to monitor patient clinically, as it would be difficult for her to go for physical therapy at this time while she is working on the plan for her to be diagnosed breast cancer.

## 2024-07-24 NOTE — ASSESSMENT & PLAN NOTE
History seronegative inflammatory arthritis, with ultrasound findings of mild synovitis, treated with Plaquenil, ultimately GI intolerant which prompted discontinuation.  She has remained off of treatment with no return of inflammatory arthritis symptoms.  She has no signs of active synovitis or inflammation at this office visit.  Continue to monitor her clinically off treatment.  Should she have a flare, will refer for updated musculoskeletal ultrasound consider a different disease modifying medication if indicated.  Monitor disease activity with lab work as ordered.  Plan follow-up in 6 months or sooner if needed.

## 2024-07-24 NOTE — ASSESSMENT & PLAN NOTE
Hip symptoms are intermittent at present, and she is working on healthier diet and home exercise program.  This symptoms worsen, she could call for a steroid injection or consider physical therapy.  Continue to monitor.

## 2024-07-24 NOTE — ASSESSMENT & PLAN NOTE
Intermittent neuropathic symptoms.  EMG studies from February 2020 significant for left greater than right ulnar neuropathy and mild left-sided median sensory neuropathy, however follow-up EMG studies 6/13/2022 negative.  Suspect small fiber neuropathy related to her myofascial syndrome.  She is intolerant to Cymbalta, Lyrica, and Gabapentin.  She will continue to modify her diet and try and incorporate anti-inflammatory choices as well as continue with home exercise program as tolerated.  Continue to monitor.

## 2024-07-24 NOTE — ASSESSMENT & PLAN NOTE
History positive ZENA.  Patient does have primary biliary cirrhosis and follows with GI in [place].  She is MI 2 positive.  She does have secondary Sjogren's likely related to her PBC, however, she treats this symptomatically.  Restasis was too expensive but she does use Systane eyedrops.  She does have history of thyroid nodules with normal thyroid function.  Thyroid antibodies were negative.  She should have follow-up thyroid function studies per primary care in view of thyroid nodules.  Continue to monitor for clinical signs and symptoms of connective tissue disease and monitor lab work as clinically warranted.

## 2024-08-06 ENCOUNTER — TELEPHONE (OUTPATIENT)
Age: 54
End: 2024-08-06

## 2024-09-19 LAB
25(OH)D3 SERPL-MCNC: 46 NG/ML (ref 30–100)
ALBUMIN SERPL-MCNC: 4 G/DL (ref 3.6–5.1)
ALBUMIN/GLOB SERPL: 1.3 (CALC) (ref 1–2.5)
ALP SERPL-CCNC: 49 U/L (ref 37–153)
ALT SERPL-CCNC: 14 U/L (ref 6–29)
APPEARANCE UR: CLEAR
AST SERPL-CCNC: 21 U/L (ref 10–35)
BASOPHILS # BLD AUTO: 20 CELLS/UL (ref 0–200)
BASOPHILS NFR BLD AUTO: 0.5 %
BILIRUB SERPL-MCNC: 0.6 MG/DL (ref 0.2–1.2)
BILIRUB UR QL STRIP: NEGATIVE
BUN SERPL-MCNC: 7 MG/DL (ref 7–25)
BUN/CREAT SERPL: 7 (CALC) (ref 6–22)
CALCIUM SERPL-MCNC: 9.3 MG/DL (ref 8.6–10.4)
CHLORIDE SERPL-SCNC: 103 MMOL/L (ref 98–110)
CO2 SERPL-SCNC: 28 MMOL/L (ref 20–32)
COLOR UR: YELLOW
CREAT SERPL-MCNC: 1.04 MG/DL (ref 0.5–1.03)
CRP SERPL-MCNC: 8.8 MG/L
EOSINOPHIL # BLD AUTO: 51 CELLS/UL (ref 15–500)
EOSINOPHIL NFR BLD AUTO: 1.3 %
ERYTHROCYTE [DISTWIDTH] IN BLOOD BY AUTOMATED COUNT: 13.4 % (ref 11–15)
ERYTHROCYTE [SEDIMENTATION RATE] IN BLOOD BY WESTERGREN METHOD: 33 MM/H
GFR/BSA.PRED SERPLBLD CYS-BASED-ARV: 65 ML/MIN/1.73M2
GLOBULIN SER CALC-MCNC: 3.1 G/DL (CALC) (ref 1.9–3.7)
GLUCOSE SERPL-MCNC: 87 MG/DL (ref 65–99)
GLUCOSE UR QL STRIP: NEGATIVE
HCT VFR BLD AUTO: 35.6 % (ref 35–45)
HGB BLD-MCNC: 11.8 G/DL (ref 11.7–15.5)
HGB UR QL STRIP: NEGATIVE
KETONES UR QL STRIP: NEGATIVE
LEUKOCYTE ESTERASE UR QL STRIP: NEGATIVE
LYMPHOCYTES # BLD AUTO: 1646 CELLS/UL (ref 850–3900)
LYMPHOCYTES NFR BLD AUTO: 42.2 %
MCH RBC QN AUTO: 28 PG (ref 27–33)
MCHC RBC AUTO-ENTMCNC: 33.1 G/DL (ref 32–36)
MCV RBC AUTO: 84.4 FL (ref 80–100)
MONOCYTES # BLD AUTO: 277 CELLS/UL (ref 200–950)
MONOCYTES NFR BLD AUTO: 7.1 %
NEUTROPHILS # BLD AUTO: 1907 CELLS/UL (ref 1500–7800)
NEUTROPHILS NFR BLD AUTO: 48.9 %
NITRITE UR QL STRIP: NEGATIVE
PH UR STRIP: 6 [PH] (ref 5–8)
PLATELET # BLD AUTO: 234 THOUSAND/UL (ref 140–400)
PMV BLD REES-ECKER: 10.4 FL (ref 7.5–12.5)
POTASSIUM SERPL-SCNC: 4.6 MMOL/L (ref 3.5–5.3)
PROT SERPL-MCNC: 7.1 G/DL (ref 6.1–8.1)
PROT UR QL STRIP: NEGATIVE
RBC # BLD AUTO: 4.22 MILLION/UL (ref 3.8–5.1)
SODIUM SERPL-SCNC: 139 MMOL/L (ref 135–146)
SP GR UR STRIP: 1.01 (ref 1–1.03)
WBC # BLD AUTO: 3.9 THOUSAND/UL (ref 3.8–10.8)

## 2024-09-20 DIAGNOSIS — E83.41 HYPERMAGNESEMIA: Primary | ICD-10-CM

## 2025-04-03 ENCOUNTER — TELEPHONE (OUTPATIENT)
Age: 55
End: 2025-04-03

## 2025-04-03 NOTE — TELEPHONE ENCOUNTER
Pt called because she has an appt with Dr. Newton on 4/24 and she is currently doing chemotherapy. She is sure that her labs will be all over the place and asks if she should reschedule the appt. She is expected to finish in the beginning of June and asks if it does need to be rescheduled that it be in June after chemo on a Tuesday or Thursday around 11 AM.

## 2025-04-07 NOTE — TELEPHONE ENCOUNTER
Patient calling for appt recommendations, advised of message per Dr. Newton. Rescheduled for soonest available Nov 20. No further questions at this time.

## 2025-04-07 NOTE — TELEPHONE ENCOUNTER
2nd attempt. LM to CB to go over message from provider:      That is best to delay the appointment to after she has completed chemotherapy.  Dr. Newton would suggest that it be at least 1 or 2 months after the completion of chemotherapy.      The reason to wait is so that her lab work will be easier to interpret that if she did it around chemotherapy.  She has right that values would be all over the place as she says.

## 2025-07-07 ENCOUNTER — TELEPHONE (OUTPATIENT)
Age: 55
End: 2025-07-07

## 2025-07-07 NOTE — TELEPHONE ENCOUNTER
Pt calling to see if we have documentation of her having mixed connected tissue. Providers are determining if she needs radiation but wanted to confirm if she had this diease.

## 2025-07-07 NOTE — PATIENT INSTRUCTIONS
1) Avoid NSAIDS - (Example - motrin, advil, ibuprofen, aleve, exederin, etc)  2) Always follow a low salt diet  3) labwork in 3 months  4) appointment in 4 months
Yes

## (undated) DEVICE — 2000CC GUARDIAN II: Brand: GUARDIAN

## (undated) DEVICE — TUBING SUCTION 5MM X 12 FT

## (undated) DEVICE — INTENDED FOR TISSUE SEPARATION, AND OTHER PROCEDURES THAT REQUIRE A SHARP SURGICAL BLADE TO PUNCTURE OR CUT.: Brand: BARD-PARKER SAFETY BLADES SIZE 15, STERILE

## (undated) DEVICE — BRA SURGICAL SZ LGE (36-39)

## (undated) DEVICE — ADHESIVE SKN CLSR HISTOACRYL FLEX 0.5ML LF

## (undated) DEVICE — SUT MONOCRYL 3-0 SH 27 IN Y416H

## (undated) DEVICE — CAUTERY TIP POLISHER: Brand: DEVON

## (undated) DEVICE — SUPER SPONGES,MEDIUM: Brand: KERLIX

## (undated) DEVICE — BETHLEHEM UNIVERSAL MINOR GEN: Brand: CARDINAL HEALTH

## (undated) DEVICE — PLUMEPEN PRO 10FT

## (undated) DEVICE — CHLORAPREP HI-LITE 26ML ORANGE

## (undated) DEVICE — SUT SILK 2-0 SH 30 IN K833H

## (undated) DEVICE — NEEDLE 25G X 1 1/2

## (undated) DEVICE — SUT MONOCRYL 4-0 PS-2 27 IN Y426H

## (undated) DEVICE — DRAPE EQUIPMENT RF WAND

## (undated) DEVICE — MEDI-VAC YANKAUER SUCTION HANDLE W/BULBOUS AND CONTROL VENT: Brand: CARDINAL HEALTH

## (undated) DEVICE — GLOVE SRG BIOGEL 6

## (undated) DEVICE — SCD SEQUENTIAL COMPRESSION COMFORT SLEEVE MEDIUM KNEE LENGTH: Brand: KENDALL SCD